# Patient Record
Sex: FEMALE | Race: WHITE | NOT HISPANIC OR LATINO | Employment: PART TIME | URBAN - METROPOLITAN AREA
[De-identification: names, ages, dates, MRNs, and addresses within clinical notes are randomized per-mention and may not be internally consistent; named-entity substitution may affect disease eponyms.]

---

## 2017-07-05 ENCOUNTER — TRANSCRIBE ORDERS (OUTPATIENT)
Dept: ADMINISTRATIVE | Facility: HOSPITAL | Age: 47
End: 2017-07-05

## 2017-07-05 DIAGNOSIS — Z12.31 VISIT FOR SCREENING MAMMOGRAM: Primary | ICD-10-CM

## 2017-07-25 ENCOUNTER — ALLSCRIPTS OFFICE VISIT (OUTPATIENT)
Dept: OTHER | Facility: OTHER | Age: 47
End: 2017-07-25

## 2017-07-25 DIAGNOSIS — M54.2 CERVICALGIA: ICD-10-CM

## 2017-07-25 DIAGNOSIS — E03.9 HYPOTHYROIDISM: ICD-10-CM

## 2017-07-27 ENCOUNTER — HOSPITAL ENCOUNTER (OUTPATIENT)
Dept: RADIOLOGY | Facility: MEDICAL CENTER | Age: 47
Discharge: HOME | End: 2017-07-27
Payer: COMMERCIAL

## 2017-07-27 DIAGNOSIS — M54.2 CERVICALGIA: ICD-10-CM

## 2017-07-27 PROCEDURE — 76536 US EXAM OF HEAD AND NECK: CPT

## 2017-08-01 ENCOUNTER — LAB CONVERSION - ENCOUNTER (OUTPATIENT)
Dept: OTHER | Facility: OTHER | Age: 47
End: 2017-08-01

## 2017-08-01 ENCOUNTER — GENERIC CONVERSION - ENCOUNTER (OUTPATIENT)
Dept: OTHER | Facility: OTHER | Age: 47
End: 2017-08-01

## 2017-08-01 LAB
T4 FREE SERPL-MCNC: 1.1 NG/DL (ref 0.8–1.8)
TSH SERPL DL<=0.05 MIU/L-ACNC: 2.38 MIU/L

## 2017-08-16 ENCOUNTER — HOSPITAL ENCOUNTER (OUTPATIENT)
Dept: RADIOLOGY | Age: 47
Discharge: HOME | End: 2017-08-16
Payer: COMMERCIAL

## 2017-08-16 DIAGNOSIS — Z12.31 VISIT FOR SCREENING MAMMOGRAM: ICD-10-CM

## 2017-08-16 PROCEDURE — G0202 SCR MAMMO BI INCL CAD: HCPCS

## 2017-08-16 PROCEDURE — 77063 BREAST TOMOSYNTHESIS BI: CPT

## 2018-01-13 VITALS
DIASTOLIC BLOOD PRESSURE: 86 MMHG | WEIGHT: 131.31 LBS | HEART RATE: 58 BPM | SYSTOLIC BLOOD PRESSURE: 130 MMHG | HEIGHT: 64 IN | BODY MASS INDEX: 22.42 KG/M2

## 2018-01-17 NOTE — RESULT NOTES
Message   thyroid labs are normal continue same dose of medication     Verified Results  (1) T4, FREE [id number] 08:55AM Grayson Hardin     Test Name Result Flag Reference   T4, FREE 1 2 ng/dL  0 8-1 8     (Q) TSH, 3RD GENERATION [id number] 08:55AM Grayson Lorenz   REPORT COMMENT:  FASTING:UNKNOWN     Test Name Result Flag Reference   TSH 1 95 mIU/L     Reference Range                         > or = 20 Years  0 40-4 50                              Pregnancy Ranges            First trimester    0 26-2 66            Second trimester   0 55-2 73            Third trimester    0 43-2 91

## 2018-01-18 NOTE — RESULT NOTES
Verified Results  (1) T4, FREE [id number] 08:28AM Caridad Mendoza     Test Name Result Flag Reference   T4, FREE 1 1 ng/dL  0 8-1 8     (Q) TSH, 3RD GENERATION [id number] 08:28AM Jane Orozco     Test Name Result Flag Reference   TSH 2 38 mIU/L     Reference Range                         > or = 20 Years  0 40-4 50                              Pregnancy Ranges            First trimester    0 26-2 66            Second trimester   0 55-2 73            Third trimester    0 43-2 91     US THYROID [id number] 01:51PM Erick Nicole Order Number: [id number]    - Patient Instructions: To schedule this appointment, please contact Central Scheduling at [phone]  Test Name Result Flag Reference   US THYROID (Report)     THYROID ULTRASOUND     INDICATION: Follow-up following right hemithyroidectomy     COMPARISON: 6/12/2013     TECHNIQUE:  Ultrasound of the thyroid was performed with a high frequency linear transducer in transverse and sagittal planes including volumetric imaging sweeps as well as traditional still imaging technique  FINDINGS:   Normal homogeneous smooth echotexture  The patient is status post partial right hemithyroidectomy  There is a small amount of residual thyroid tissue measuring 0 7 x 0 8 x 1 6 cm  The large nodule on the previous study has been resected  There is a 0 3 x 0 7 x 1 1 cm normal-appearing lymph node in the right anterior mid jugular chain  Left gland: 0 8 x 1 0 x 3 3 cm  No dominant nodules  Isthmus: The isthmus is 0 3 cm in AP dimension  IMPRESSION:      Status post partial right hemithyroidectomy with small residual normal right-sided thyroid tissue remaining  Unremarkable left lobe               Workstation performed: [id number]     Signed by:   French Alberto MD   7/31/17

## 2018-06-01 DIAGNOSIS — E03.9 HYPOTHYROIDISM, UNSPECIFIED TYPE: Primary | ICD-10-CM

## 2018-06-01 RX ORDER — LEVOTHYROXINE SODIUM 0.05 MG/1
TABLET ORAL
Qty: 108 TABLET | Refills: 1 | Status: SHIPPED | OUTPATIENT
Start: 2018-06-01 | End: 2018-07-30 | Stop reason: SDUPTHER

## 2018-07-26 ENCOUNTER — TRANSCRIBE ORDERS (OUTPATIENT)
Dept: ADMINISTRATIVE | Facility: HOSPITAL | Age: 48
End: 2018-07-26

## 2018-07-26 DIAGNOSIS — Z12.39 SCREENING BREAST EXAMINATION: Primary | ICD-10-CM

## 2018-07-30 ENCOUNTER — OFFICE VISIT (OUTPATIENT)
Dept: ENDOCRINOLOGY | Facility: CLINIC | Age: 48
End: 2018-07-30
Payer: COMMERCIAL

## 2018-07-30 VITALS
HEART RATE: 51 BPM | BODY MASS INDEX: 21.86 KG/M2 | DIASTOLIC BLOOD PRESSURE: 80 MMHG | WEIGHT: 131.2 LBS | HEIGHT: 65 IN | SYSTOLIC BLOOD PRESSURE: 110 MMHG

## 2018-07-30 DIAGNOSIS — E03.9 HYPOTHYROIDISM, UNSPECIFIED TYPE: Primary | ICD-10-CM

## 2018-07-30 LAB
T4 FREE SERPL-MCNC: 1.12 NG/DL (ref 0.76–1.46)
TSH SERPL DL<=0.05 MIU/L-ACNC: 1.9 UIU/ML (ref 0.36–3.74)

## 2018-07-30 PROCEDURE — 36415 COLL VENOUS BLD VENIPUNCTURE: CPT | Performed by: INTERNAL MEDICINE

## 2018-07-30 PROCEDURE — 84439 ASSAY OF FREE THYROXINE: CPT | Performed by: INTERNAL MEDICINE

## 2018-07-30 PROCEDURE — 99213 OFFICE O/P EST LOW 20 MIN: CPT | Performed by: INTERNAL MEDICINE

## 2018-07-30 PROCEDURE — 84443 ASSAY THYROID STIM HORMONE: CPT | Performed by: INTERNAL MEDICINE

## 2018-07-30 RX ORDER — LEVOTHYROXINE SODIUM 0.05 MG/1
TABLET ORAL
Qty: 110 TABLET | Refills: 3 | Status: SHIPPED | OUTPATIENT
Start: 2018-07-30 | End: 2019-04-15 | Stop reason: SDUPTHER

## 2018-07-30 NOTE — ASSESSMENT & PLAN NOTE
Continue levothyroxine at current dose  Will check thyroid function test today  If TSH normal she can follow up with her primary care from here onwards    Thyroid function test should be checked every 6 months to a year

## 2018-07-30 NOTE — LETTER
July 30, 2018     Bree Nair MD  [address]    Patient: Rajwinder Her   YOB: 1970   Date of Visit: 7/30/2018       Dear Dr Mitchell Begin: Thank you for referring Sapna Olmstead to me for evaluation  Below are my notes for this consultation  If you have questions, please do not hesitate to call me  I look forward to following your patient along with you  Sincerely,        Heaven Singh MD        CC: No Recipients  Heaven Singh MD  7/30/2018 10:56 AM  Sign at close encounter   Rajwinder Her 57 y o  female MRN: [id number]    Encounter: [id number]      Assessment/Plan     Problem List Items Addressed This Visit     Hypothyroidism - Primary     Continue levothyroxine at current dose  Will check thyroid function test today  If TSH normal she can follow up with her primary care from here onwards  Thyroid function test should be checked every 6 months to a year         Relevant Medications    levothyroxine 50 mcg tablet    Other Relevant Orders    T4, free Clinic Collect    TSH, 3rd generation Clinic Collect        CC:   HYPOTHYROIDISM    History of Present Illness      HPI: 57year old female with History of Large Right Sided Thyroid Nodule, s/p hemithyroidectomy (benign) and hypothyroidism here for follow-up  She is currently on levothyroxine 50 mcg 1 tablet Monday to Friday and 2 on Saturday and Sunday  Has been taking it regularly and properly  Generally feels well, denies any fatigue, weight changes, constipation or cold intolerance  Occasional sleep disturbances    Review of Systems   Constitutional: Negative for fatigue and unexpected weight change  Respiratory: Negative for cough and shortness of breath  Cardiovascular: Negative for chest pain, palpitations and leg swelling  Gastrointestinal: Negative for constipation, diarrhea, nausea and vomiting  Endocrine: Negative for polydipsia and polyuria     Skin: Negative for color change, pallor, rash and wound  Psychiatric/Behavioral: Positive for sleep disturbance  All other systems reviewed and are negative  Historical Information   History reviewed  No pertinent past medical history  Past Surgical History:   Procedure Laterality Date    THYROID LOBECTOMY       Social History   History   Alcohol Use No     History   Drug Use No     History   Smoking Status    Never Smoker   Smokeless Tobacco    Never Used     Family History: History reviewed  No pertinent family history  Meds/Allergies   Current Outpatient Prescriptions   Medication Sig Dispense Refill    levothyroxine 50 mcg tablet 1 tab mon-Friday and 2 on sat and Sunday 110 tablet 3     No current facility-administered medications for this visit  Allergies   Allergen Reactions    Bee Venom     Penicillins        Objective   Vitals: Blood pressure 110/80, pulse (!) 51, height 5' 5" (1 651 m), weight 59 5 kg (131 lb 3 2 oz)  Physical Exam   Constitutional: She is oriented to person, place, and time  She appears well-developed and well-nourished  No distress  HENT:   Head: Normocephalic and atraumatic  Mouth/Throat: No oropharyngeal exudate  Eyes: Conjunctivae and EOM are normal  No scleral icterus  Neck: Normal range of motion  Neck supple  Anterior neck surgical scar-no masses   Cardiovascular: Normal rate, regular rhythm and normal heart sounds  No murmur heard  Pulmonary/Chest: Effort normal and breath sounds normal    Abdominal: Soft  Bowel sounds are normal  She exhibits no distension  There is no tenderness  There is no rebound  Musculoskeletal: Normal range of motion  She exhibits no deformity  Neurological: She is alert and oriented to person, place, and time  Skin: Skin is warm  No rash noted  No erythema  No pallor  Psychiatric: She has a normal mood and affect   Her behavior is normal  Judgment and thought content normal        The history was obtained from the review of the chart, patient  Lab Results:   Lab Results   Component Value Date/Time    TSH 3RD GENERATON 2 38 07/31/2017 08:28 AM    Free T4 1 1 07/31/2017 08:28 AM     Imaging Studies:     Study Result     THYROID ULTRASOUND     INDICATION: Follow-up following right hemithyroidectomy     COMPARISON: 6/12/2013     TECHNIQUE:   Ultrasound of the thyroid was performed with a high frequency linear transducer in transverse and sagittal planes including volumetric imaging sweeps as well as traditional still imaging technique      FINDINGS:  Normal homogeneous smooth echotexture      The patient is status post partial right hemithyroidectomy  There is a small amount of residual thyroid tissue measuring 0 7 x 0 8 x 1 6 cm  The large nodule on the previous study has been resected       There is a 0 3 x 0 7 x 1 1 cm normal-appearing lymph node in the right anterior mid jugular chain      Left gland:  0 8 x 1 0 x 3 3 cm  No dominant nodules           Isthmus: The isthmus is 0 3 cm in AP dimension           IMPRESSION:     Status post partial right hemithyroidectomy with small residual normal right-sided thyroid tissue remaining  Unremarkable left lobe  I have personally reviewed pertinent reports  Portions of the record may have been created with voice recognition software  Occasional wrong word or "sound a like" substitutions may have occurred due to the inherent limitations of voice recognition software  Read the chart carefully and recognize, using context, where substitutions have occurred

## 2018-07-30 NOTE — LETTER
July 30, 2018     Richard Underwood MD  [address]  [address]    Patient: Yousif Hagen   YOB: 1970   Date of Visit: 7/30/2018       Dear Dr Catherine Byrnes: Thank you for referring Amber Soto to me for evaluation  Below are my notes for this consultation  If you have questions, please do not hesitate to call me  I look forward to following your patient along with you  Sincerely,        Azeem Gallegos MD        CC: No Recipients  Azeem Gallegos MD  7/30/2018 10:49 AM  Sign at close encounter   Yousif Hagen 57 y o  female MRN: [id number]    Encounter: [id number]      Assessment/Plan     Problem List Items Addressed This Visit     None      Visit Diagnoses     Hypothyroidism, unspecified type    -  Primary    Relevant Medications    levothyroxine 50 mcg tablet    Other Relevant Orders    T4, free Clinic Collect    TSH, 3rd generation Clinic Collect        CC:   ***    History of Present Illness      HPI: 57year old female with History of Large Right Sided Thyroid Nodule, s/p hemithyroidectomy (benign    Review of Systems    Historical Information   History reviewed  No pertinent past medical history  Past Surgical History:   Procedure Laterality Date    THYROID LOBECTOMY       Social History   History   Alcohol Use No     History   Drug Use No     History   Smoking Status    Never Smoker   Smokeless Tobacco    Never Used     Family History: History reviewed  No pertinent family history  Meds/Allergies   Current Outpatient Prescriptions   Medication Sig Dispense Refill    levothyroxine 50 mcg tablet 1 tab mon-Friday and 2 on sat and Sunday 110 tablet 3     No current facility-administered medications for this visit  Allergies   Allergen Reactions    Bee Venom     Penicillins        Objective   Vitals: Blood pressure 110/80, pulse (!) 51, height 5' 5" (1 651 m), weight 59 5 kg (131 lb 3 2 oz)      Physical Exam    The history was obtained from the review of the chart, {HISTORY OBTAINED FROM:[id number]}  Lab Results:   Lab Results   Component Value Date/Time    TSH 3RD GENERATON 2 38 07/31/2017 08:28 AM    Free T4 1 1 07/31/2017 08:28 AM     Imaging Studies:     Study Result     THYROID ULTRASOUND     INDICATION: Follow-up following right hemithyroidectomy     COMPARISON: 6/12/2013     TECHNIQUE:   Ultrasound of the thyroid was performed with a high frequency linear transducer in transverse and sagittal planes including volumetric imaging sweeps as well as traditional still imaging technique      FINDINGS:  Normal homogeneous smooth echotexture      The patient is status post partial right hemithyroidectomy  There is a small amount of residual thyroid tissue measuring 0 7 x 0 8 x 1 6 cm  The large nodule on the previous study has been resected       There is a 0 3 x 0 7 x 1 1 cm normal-appearing lymph node in the right anterior mid jugular chain      Left gland:  0 8 x 1 0 x 3 3 cm  No dominant nodules           Isthmus: The isthmus is 0 3 cm in AP dimension           IMPRESSION:     Status post partial right hemithyroidectomy with small residual normal right-sided thyroid tissue remaining  Unremarkable left lobe  I have personally reviewed pertinent reports  Portions of the record may have been created with voice recognition software  Occasional wrong word or "sound a like" substitutions may have occurred due to the inherent limitations of voice recognition software  Read the chart carefully and recognize, using context, where substitutions have occurred

## 2018-07-30 NOTE — PROGRESS NOTES
Rajwinder Her 57 y o  female MRN: [id number]    Encounter: [id number]      Assessment/Plan     Problem List Items Addressed This Visit     Hypothyroidism - Primary     Continue levothyroxine at current dose  Will check thyroid function test today  If TSH normal she can follow up with her primary care from here onwards  Thyroid function test should be checked every 6 months to a year         Relevant Medications    levothyroxine 50 mcg tablet    Other Relevant Orders    T4, free Clinic Collect    TSH, 3rd generation Clinic Collect        CC:   HYPOTHYROIDISM    History of Present Illness      HPI: 57year old female with History of Large Right Sided Thyroid Nodule, s/p hemithyroidectomy (benign) and hypothyroidism here for follow-up  She is currently on levothyroxine 50 mcg 1 tablet Monday to Friday and 2 on Saturday and Sunday  Has been taking it regularly and properly  Generally feels well, denies any fatigue, weight changes, constipation or cold intolerance  Occasional sleep disturbances    Review of Systems   Constitutional: Negative for fatigue and unexpected weight change  Respiratory: Negative for cough and shortness of breath  Cardiovascular: Negative for chest pain, palpitations and leg swelling  Gastrointestinal: Negative for constipation, diarrhea, nausea and vomiting  Endocrine: Negative for polydipsia and polyuria  Skin: Negative for color change, pallor, rash and wound  Psychiatric/Behavioral: Positive for sleep disturbance  All other systems reviewed and are negative  Historical Information   History reviewed  No pertinent past medical history  Past Surgical History:   Procedure Laterality Date    THYROID LOBECTOMY       Social History   History   Alcohol Use No     History   Drug Use No     History   Smoking Status    Never Smoker   Smokeless Tobacco    Never Used     Family History: History reviewed  No pertinent family history      Meds/Allergies   Current Outpatient Prescriptions   Medication Sig Dispense Refill    levothyroxine 50 mcg tablet 1 tab mon-Friday and 2 on sat and Sunday 110 tablet 3     No current facility-administered medications for this visit  Allergies   Allergen Reactions    Bee Venom     Penicillins        Objective   Vitals: Blood pressure 110/80, pulse (!) 51, height 5' 5" (1 651 m), weight 59 5 kg (131 lb 3 2 oz)  Physical Exam   Constitutional: She is oriented to person, place, and time  She appears well-developed and well-nourished  No distress  HENT:   Head: Normocephalic and atraumatic  Mouth/Throat: No oropharyngeal exudate  Eyes: Conjunctivae and EOM are normal  No scleral icterus  Neck: Normal range of motion  Neck supple  Anterior neck surgical scar-no masses   Cardiovascular: Normal rate, regular rhythm and normal heart sounds  No murmur heard  Pulmonary/Chest: Effort normal and breath sounds normal    Abdominal: Soft  Bowel sounds are normal  She exhibits no distension  There is no tenderness  There is no rebound  Musculoskeletal: Normal range of motion  She exhibits no deformity  Neurological: She is alert and oriented to person, place, and time  Skin: Skin is warm  No rash noted  No erythema  No pallor  Psychiatric: She has a normal mood and affect  Her behavior is normal  Judgment and thought content normal        The history was obtained from the review of the chart, patient      Lab Results:   Lab Results   Component Value Date/Time    TSH 3RD GENERATON 2 38 07/31/2017 08:28 AM    Free T4 1 1 07/31/2017 08:28 AM     Imaging Studies:     Study Result     THYROID ULTRASOUND     INDICATION: Follow-up following right hemithyroidectomy     COMPARISON: 6/12/2013     TECHNIQUE:   Ultrasound of the thyroid was performed with a high frequency linear transducer in transverse and sagittal planes including volumetric imaging sweeps as well as traditional still imaging technique      FINDINGS:  Normal homogeneous smooth echotexture      The patient is status post partial right hemithyroidectomy  There is a small amount of residual thyroid tissue measuring 0 7 x 0 8 x 1 6 cm  The large nodule on the previous study has been resected       There is a 0 3 x 0 7 x 1 1 cm normal-appearing lymph node in the right anterior mid jugular chain      Left gland:  0 8 x 1 0 x 3 3 cm  No dominant nodules           Isthmus: The isthmus is 0 3 cm in AP dimension           IMPRESSION:     Status post partial right hemithyroidectomy with small residual normal right-sided thyroid tissue remaining  Unremarkable left lobe  I have personally reviewed pertinent reports  Portions of the record may have been created with voice recognition software  Occasional wrong word or "sound a like" substitutions may have occurred due to the inherent limitations of voice recognition software  Read the chart carefully and recognize, using context, where substitutions have occurred

## 2018-08-01 ENCOUNTER — TELEPHONE (OUTPATIENT)
Dept: ENDOCRINOLOGY | Facility: CLINIC | Age: 48
End: 2018-08-01

## 2018-08-01 NOTE — PROGRESS NOTES
Please call the patient regarding her abnormal result   TSH OK- Continue levothyroxine at current dose and f/u with PCP

## 2018-08-20 ENCOUNTER — TRANSCRIBE ORDERS (OUTPATIENT)
Dept: ADMINISTRATIVE | Facility: HOSPITAL | Age: 48
End: 2018-08-20

## 2018-08-20 ENCOUNTER — HOSPITAL ENCOUNTER (OUTPATIENT)
Dept: RADIOLOGY | Age: 48
Discharge: HOME | End: 2018-08-20
Payer: COMMERCIAL

## 2018-08-20 DIAGNOSIS — Z12.39 SCREENING BREAST EXAMINATION: ICD-10-CM

## 2018-08-20 DIAGNOSIS — R92.2 BREAST DENSITY: Primary | ICD-10-CM

## 2018-08-20 DIAGNOSIS — R92.2 DENSE BREAST TISSUE: ICD-10-CM

## 2018-08-20 PROCEDURE — 77063 BREAST TOMOSYNTHESIS BI: CPT

## 2018-08-20 PROCEDURE — 77067 SCR MAMMO BI INCL CAD: CPT

## 2018-08-23 ENCOUNTER — HOSPITAL ENCOUNTER (OUTPATIENT)
Dept: ULTRASOUND IMAGING | Facility: CLINIC | Age: 48
Discharge: HOME | End: 2018-08-23
Payer: COMMERCIAL

## 2018-08-23 DIAGNOSIS — R92.2 BREAST DENSITY: ICD-10-CM

## 2018-08-23 PROCEDURE — 76377 3D RENDER W/INTRP POSTPROCES: CPT

## 2018-08-23 PROCEDURE — 76641 ULTRASOUND BREAST COMPLETE: CPT

## 2019-01-16 ENCOUNTER — APPOINTMENT (EMERGENCY)
Dept: ULTRASOUND IMAGING | Facility: HOSPITAL | Age: 49
End: 2019-01-16
Payer: COMMERCIAL

## 2019-01-16 ENCOUNTER — HOSPITAL ENCOUNTER (EMERGENCY)
Facility: HOSPITAL | Age: 49
Discharge: HOME | End: 2019-01-16
Attending: EMERGENCY MEDICINE | Admitting: EMERGENCY MEDICINE
Payer: COMMERCIAL

## 2019-01-16 ENCOUNTER — APPOINTMENT (EMERGENCY)
Dept: RADIOLOGY | Facility: HOSPITAL | Age: 49
End: 2019-01-16
Payer: COMMERCIAL

## 2019-01-16 VITALS
WEIGHT: 127 LBS | BODY MASS INDEX: 21.16 KG/M2 | SYSTOLIC BLOOD PRESSURE: 125 MMHG | DIASTOLIC BLOOD PRESSURE: 59 MMHG | OXYGEN SATURATION: 100 % | HEIGHT: 65 IN | TEMPERATURE: 98.6 F | RESPIRATION RATE: 14 BRPM | HEART RATE: 65 BPM

## 2019-01-16 DIAGNOSIS — R60.0 PEDAL EDEMA: Primary | ICD-10-CM

## 2019-01-16 LAB
ALBUMIN SERPL BCP-MCNC: 3.7 G/DL (ref 3.5–5)
ALP SERPL-CCNC: 54 U/L (ref 46–116)
ALT SERPL W P-5'-P-CCNC: 15 U/L (ref 12–78)
ANION GAP SERPL CALCULATED.3IONS-SCNC: 7 MMOL/L (ref 4–13)
AST SERPL W P-5'-P-CCNC: 12 U/L (ref 5–45)
ATRIAL RATE: 56 BPM
BASOPHILS # BLD AUTO: 0.02 THOUSANDS/ΜL (ref 0–0.1)
BASOPHILS NFR BLD AUTO: 0 % (ref 0–1)
BILIRUB SERPL-MCNC: 0.7 MG/DL (ref 0.2–1)
BUN SERPL-MCNC: 12 MG/DL (ref 5–25)
CALCIUM SERPL-MCNC: 8.9 MG/DL (ref 8.3–10.1)
CHLORIDE SERPL-SCNC: 104 MMOL/L (ref 100–108)
CO2 SERPL-SCNC: 30 MMOL/L (ref 21–32)
CREAT SERPL-MCNC: 0.85 MG/DL (ref 0.6–1.3)
EOSINOPHIL # BLD AUTO: 0.1 THOUSAND/ΜL (ref 0–0.61)
EOSINOPHIL NFR BLD AUTO: 2 % (ref 0–6)
ERYTHROCYTE [DISTWIDTH] IN BLOOD BY AUTOMATED COUNT: 12.3 % (ref 11.6–15.1)
GFR SERPL CREATININE-BSD FRML MDRD: 82 ML/MIN/1.73SQ M
GLUCOSE SERPL-MCNC: 87 MG/DL (ref 65–140)
HCT VFR BLD AUTO: 35.4 % (ref 34.8–46.1)
HGB BLD-MCNC: 11.4 G/DL (ref 11.5–15.4)
IMM GRANULOCYTES # BLD AUTO: 0.04 THOUSAND/UL (ref 0–0.2)
IMM GRANULOCYTES NFR BLD AUTO: 1 % (ref 0–2)
LYMPHOCYTES # BLD AUTO: 1.31 THOUSANDS/ΜL (ref 0.6–4.47)
LYMPHOCYTES NFR BLD AUTO: 24 % (ref 14–44)
MAGNESIUM SERPL-MCNC: 2 MG/DL (ref 1.6–2.6)
MCH RBC QN AUTO: 27.2 PG (ref 26.8–34.3)
MCHC RBC AUTO-ENTMCNC: 32.2 G/DL (ref 31.4–37.4)
MCV RBC AUTO: 85 FL (ref 82–98)
MONOCYTES # BLD AUTO: 0.39 THOUSAND/ΜL (ref 0.17–1.22)
MONOCYTES NFR BLD AUTO: 7 % (ref 4–12)
NEUTROPHILS # BLD AUTO: 3.64 THOUSANDS/ΜL (ref 1.85–7.62)
NEUTS SEG NFR BLD AUTO: 66 % (ref 43–75)
NRBC BLD AUTO-RTO: 0 /100 WBCS
NT-PROBNP SERPL-MCNC: 107 PG/ML
P AXIS: 74 DEGREES
PLATELET # BLD AUTO: 278 THOUSANDS/UL (ref 149–390)
PMV BLD AUTO: 9.1 FL (ref 8.9–12.7)
POTASSIUM SERPL-SCNC: 3.8 MMOL/L (ref 3.5–5.3)
PR INTERVAL: 176 MS
PROT SERPL-MCNC: 6.7 G/DL (ref 6.4–8.2)
QRS AXIS: 84 DEGREES
QRSD INTERVAL: 94 MS
QT INTERVAL: 428 MS
QTC INTERVAL: 413 MS
RBC # BLD AUTO: 4.19 MILLION/UL (ref 3.81–5.12)
SODIUM SERPL-SCNC: 141 MMOL/L (ref 136–145)
T WAVE AXIS: 74 DEGREES
TSH SERPL DL<=0.05 MIU/L-ACNC: 2.34 UIU/ML (ref 0.36–3.74)
VENTRICULAR RATE: 56 BPM
WBC # BLD AUTO: 5.5 THOUSAND/UL (ref 4.31–10.16)

## 2019-01-16 PROCEDURE — 93971 EXTREMITY STUDY: CPT

## 2019-01-16 PROCEDURE — 84443 ASSAY THYROID STIM HORMONE: CPT | Performed by: EMERGENCY MEDICINE

## 2019-01-16 PROCEDURE — 83880 ASSAY OF NATRIURETIC PEPTIDE: CPT | Performed by: EMERGENCY MEDICINE

## 2019-01-16 PROCEDURE — 80053 COMPREHEN METABOLIC PANEL: CPT | Performed by: EMERGENCY MEDICINE

## 2019-01-16 PROCEDURE — 99284 EMERGENCY DEPT VISIT MOD MDM: CPT

## 2019-01-16 PROCEDURE — 71046 X-RAY EXAM CHEST 2 VIEWS: CPT

## 2019-01-16 PROCEDURE — 36415 COLL VENOUS BLD VENIPUNCTURE: CPT | Performed by: EMERGENCY MEDICINE

## 2019-01-16 PROCEDURE — 83735 ASSAY OF MAGNESIUM: CPT | Performed by: EMERGENCY MEDICINE

## 2019-01-16 PROCEDURE — 93971 EXTREMITY STUDY: CPT | Performed by: SURGERY

## 2019-01-16 PROCEDURE — 93005 ELECTROCARDIOGRAM TRACING: CPT

## 2019-01-16 PROCEDURE — 85025 COMPLETE CBC W/AUTO DIFF WBC: CPT | Performed by: EMERGENCY MEDICINE

## 2019-01-16 PROCEDURE — 93010 ELECTROCARDIOGRAM REPORT: CPT | Performed by: INTERNAL MEDICINE

## 2019-01-16 NOTE — DISCHARGE INSTRUCTIONS
Leg Edema   WHAT YOU NEED TO KNOW:   Leg edema is swelling caused by fluid buildup  Your legs may swell if you sit or stand for long periods of time, are pregnant, or are injured  Swelling may also occur if you have heart failure or circulation problems  This means that your heart does not pump blood through your body as it should  DISCHARGE INSTRUCTIONS:   Self-care:   · Elevate your legs:  Raise your legs above the level of your heart as often as you can  This will help decrease swelling and pain  Prop your legs on pillows or blankets to keep them elevated comfortably  · Wear pressure stockings: These tight stockings put pressure on your legs to promote blood flow and prevent blood clots  Wear the stockings during the day  Do not wear them while you sleep  · Apply heat:  Heat helps decrease pain and swelling  Apply heat on the area for 20 to 30 minutes every 2 hours for as many days as directed  · Stay active:  Do not stand or sit for long periods of time  Ask your healthcare provider about the best exercise plan for you  · Eat healthy foods:  Healthy foods include fruits, vegetables, whole-grain breads, low-fat dairy products, beans, lean meats, and fish  Ask if you need to be on a special diet  Limit salt  Salt will make your body hold even more fluid  Follow up with your healthcare provider as directed:  Write down your questions so you remember to ask them during your visits  Contact your healthcare provider if:   · You have a fever or feel more tired than usual     · The veins in your legs look larger than usual  They may look full or bulging  · Your legs itch or feel heavy  · You have red or white areas or sores on your legs  The skin may also appear dimpled or have indentations  · You are gaining weight  · You have trouble moving your ankles  · The swelling does not go away, or other parts of your body swell      · You have questions or concerns about your condition or care   Return to the emergency department if:   · You cannot walk  · You feel faint or confused  · Your skin turns blue or gray  · Your leg feels warm, tender, and painful  It may be swollen and red  · You have chest pain or trouble breathing that is worse when you lie down  · You suddenly feel lightheaded and have trouble breathing  · You have new and sudden chest pain  You may have more pain when you take deep breaths or cough  You may also cough up blood  © 2017 [employer]  Information is for End User's use only and may not be sold, redistributed or otherwise used for commercial purposes  All illustrations and images included in CareNotes® are the copyrighted property of [employer]  or [employer]  The above information is an  only  It is not intended as medical advice for individual conditions or treatments  Talk to your doctor, nurse or pharmacist before following any medical regimen to see if it is safe and effective for you

## 2019-01-16 NOTE — ED PROVIDER NOTES
History  Chief Complaint   Patient presents with    Leg Swelling     woke up two days ago, left knee and leg swelling  woke up to right leg swelling this morning and swelling and pain in hands  no known injuries  pt had ultrasound of R leg yesterday  61-year-old female patient presents emergency department for evaluation of pedal edema  The patient is unsure as to what causes she has never had this before  Patient does have a saphenous vein on the right side which has been treated in the past and she is in follow-up for this recently  Patient has no shortness of breath, chest pains, no nausea, no vomiting, diarrhea, fevers, chills  The patient's lungs are clear equal bilaterally and she is not working to breathe  There is no adventitious breath sounds there is no accessory muscle use  The patient will be evaluated with a differential diagnosis to include but not be limited to benign pedal edema, congestive heart failure, DVT  History provided by:  Patient   used: No    Medical Problem   Severity:  Mild  Onset quality:  Gradual  Timing:  Constant  Progression:  Waxing and waning  Chronicity:  New  Associated symptoms: no cough, no fever, no myalgias, no rhinorrhea and no vomiting        Prior to Admission Medications   Prescriptions Last Dose Informant Patient Reported? Taking?   levothyroxine 50 mcg tablet   No No   Si tab mon-Friday and 2 on sat and       Facility-Administered Medications: None       Past Medical History:   Diagnosis Date    Mitral valve disorder        Past Surgical History:   Procedure Laterality Date    SAPHENOUS VEIN GRAFT RESECTION      THYROID LOBECTOMY      VENTRICULAR ABLATION SURGERY         History reviewed  No pertinent family history  I have reviewed and agree with the history as documented      Social History   Substance Use Topics    Smoking status: Never Smoker    Smokeless tobacco: Never Used    Alcohol use No        Review of Systems   Constitutional: Negative for fever  HENT: Negative for rhinorrhea  Respiratory: Negative for cough  Gastrointestinal: Negative for vomiting  Musculoskeletal: Negative for myalgias  All other systems reviewed and are negative  Physical Exam  Physical Exam   Constitutional: She is oriented to person, place, and time  She appears well-developed and well-nourished  HENT:   Head: Normocephalic and atraumatic  Right Ear: External ear normal    Left Ear: External ear normal    Eyes: Conjunctivae and EOM are normal    Neck: No JVD present  No tracheal deviation present  No thyromegaly present  Cardiovascular: Normal rate  Pulmonary/Chest: Effort normal and breath sounds normal  No stridor  Abdominal: Soft  She exhibits no distension and no mass  There is no tenderness  There is no guarding  No hernia  Musculoskeletal: Normal range of motion  She exhibits no edema, tenderness or deformity  Lymphadenopathy:     She has no cervical adenopathy  Neurological: She is alert and oriented to person, place, and time  Skin: Skin is warm  No rash noted  No erythema  No pallor  Psychiatric: She has a normal mood and affect  Her behavior is normal    Nursing note and vitals reviewed        Vital Signs  ED Triage Vitals   Temperature Pulse Respirations Blood Pressure SpO2   01/16/19 1646 01/16/19 1646 01/16/19 1646 01/16/19 1646 01/16/19 1646   98 6 °F (37 °C) 77 14 130/51 98 %      Temp src Heart Rate Source Patient Position - Orthostatic VS BP Location FiO2 (%)   -- 01/16/19 1840 01/16/19 1840 01/16/19 1840 --    Monitor Lying Left arm       Pain Score       01/16/19 1646       4           Vitals:    01/16/19 1646 01/16/19 1840   BP: 130/51 125/59   Pulse: 77 65   Patient Position - Orthostatic VS:  Lying       Visual Acuity      ED Medications  Medications - No data to display    Diagnostic Studies  Results Reviewed     Procedure Component Value Units Date/Time    BNP [[id number]]  (Normal) Collected:  01/16/19 1728    Lab Status:  Final result Specimen:  Blood from Arm, Right Updated:  01/16/19 1803     NT-proBNP 107 pg/mL     TSH, 3rd generation with Free T4 reflex [[id number]]  (Normal) Collected:  01/16/19 1728    Lab Status:  Final result Specimen:  Blood from Arm, Right Updated:  01/16/19 1803     TSH 3RD GENERATON 2 339 uIU/mL     Narrative:         Patients undergoing fluorescein dye angiography may retain small amounts of fluorescein in the body for 48-72 hours post procedure  Samples containing fluorescein can produce falsely depressed TSH values  If the patient had this procedure,a specimen should be resubmitted post fluorescein clearance  The recommended reference ranges for TSH during pregnancy are as follows:  First trimester 0 1 to 2 5 uIU/mL  Second trimester  0 2 to 3 0 uIU/mL  Third trimester 0 3 to 3 0 uIU/m      Magnesium [[id number]]  (Normal) Collected:  01/16/19 1728    Lab Status:  Final result Specimen:  Blood from Arm, Right Updated:  01/16/19 1803     Magnesium 2 0 mg/dL     Comprehensive metabolic panel [[id number]] Collected:  01/16/19 1728    Lab Status:  Final result Specimen:  Blood from Arm, Right Updated:  01/16/19 1755     Sodium 141 mmol/L      Potassium 3 8 mmol/L      Chloride 104 mmol/L      CO2 30 mmol/L      ANION GAP 7 mmol/L      BUN 12 mg/dL      Creatinine 0 85 mg/dL      Glucose 87 mg/dL      Calcium 8 9 mg/dL      AST 12 U/L      ALT 15 U/L      Alkaline Phosphatase 54 U/L      Total Protein 6 7 g/dL      Albumin 3 7 g/dL      Total Bilirubin 0 70 mg/dL      eGFR 82 ml/min/1 73sq m     Narrative:         National Kidney Disease Education Program recommendations are as follows:  GFR calculation is accurate only with a steady state creatinine  Chronic Kidney disease less than 60 ml/min/1 73 sq  meters  Kidney failure less than 15 ml/min/1 73 sq  meters      CBC and differential [[id number]]  (Abnormal) Collected:  01/16/19 1728    Lab Status:  Final result Specimen:  Blood from Arm, Right Updated:  01/16/19 1740     WBC 5 50 Thousand/uL      RBC 4 19 Million/uL      Hemoglobin 11 4 (L) g/dL      Hematocrit 35 4 %      MCV 85 fL      MCH 27 2 pg      MCHC 32 2 g/dL      RDW 12 3 %      MPV 9 1 fL      Platelets 907 Thousands/uL      nRBC 0 /100 WBCs      Neutrophils Relative 66 %      Immat GRANS % 1 %      Lymphocytes Relative 24 %      Monocytes Relative 7 %      Eosinophils Relative 2 %      Basophils Relative 0 %      Neutrophils Absolute 3 64 Thousands/µL      Immature Grans Absolute 0 04 Thousand/uL      Lymphocytes Absolute 1 31 Thousands/µL      Monocytes Absolute 0 39 Thousand/µL      Eosinophils Absolute 0 10 Thousand/µL      Basophils Absolute 0 02 Thousands/µL                  XR chest 2 views   ED Interpretation by Minal Streeter DO (01/16 1843)   Chest xray was evaluated and interpreted by me primarily  There was no obvious air trapping/emphysema, there was no pulmonary congestion to show CHF,  There was were no infiltrates, and there was no obvious pleural effusion:      Final Result by Anisa Olvera MD (01/16 2026)      No acute cardiopulmonary disease              Workstation performed: [id number]         VAS lower limb venous duplex study, unilateral/limited   Final Result by Del Harper MD (01/16 1840)                 Procedures  Procedures       Phone Contacts  ED Phone Contact    ED Course                               MDM  Number of Diagnoses or Management Options  Pedal edema: new and requires workup     Amount and/or Complexity of Data Reviewed  Clinical lab tests: ordered and reviewed  Tests in the radiology section of CPT®: ordered and reviewed  Decide to obtain previous medical records or to obtain history from someone other than the patient: yes  Review and summarize past medical records: yes    Patient Progress  Patient progress: stable    CritCare Time    Disposition  Final diagnoses:   Pedal edema     Time reflects when diagnosis was documented in both MDM as applicable and the Disposition within this note     Time User Action Codes Description Comment    1/16/2019  6:44 PM Rio Toney Add [R60 0] Pedal edema       ED Disposition     ED Disposition Condition Comment    Discharge  Pamela Canales discharge to home/self care  Condition at discharge: Good        Follow-up Information     Follow up With Specialties Details Why Contact Info    Trung Camara MD Family Medicine   [address] [address]  [phone]            Discharge Medication List as of 1/16/2019  6:46 PM      CONTINUE these medications which have NOT CHANGED    Details   levothyroxine 50 mcg tablet 1 tab mon-Friday and 2 on sat and Sunday, Normal           No discharge procedures on file      ED Provider  Electronically Signed by           Jordy Boyce DO  01/18/19 1069

## 2019-01-17 NOTE — ED NOTES
D/c reviewed with pt prior to discharge by provider  Ambulatory off unit with steady gait        Hampton Cooks, RN  63/95/22 1920

## 2019-04-15 ENCOUNTER — TELEPHONE (OUTPATIENT)
Dept: ENDOCRINOLOGY | Facility: CLINIC | Age: 49
End: 2019-04-15

## 2019-04-15 DIAGNOSIS — E03.9 HYPOTHYROIDISM, UNSPECIFIED TYPE: ICD-10-CM

## 2019-04-15 RX ORDER — LEVOTHYROXINE SODIUM 0.05 MG/1
TABLET ORAL
Qty: 110 TABLET | Refills: 1 | Status: SHIPPED | OUTPATIENT
Start: 2019-04-15 | End: 2019-08-16 | Stop reason: SDUPTHER

## 2019-07-30 ENCOUNTER — TRANSCRIBE ORDERS (OUTPATIENT)
Dept: ADMINISTRATIVE | Facility: HOSPITAL | Age: 49
End: 2019-07-30

## 2019-07-30 DIAGNOSIS — Z12.39 BREAST SCREENING, UNSPECIFIED: Primary | ICD-10-CM

## 2019-08-16 ENCOUNTER — OFFICE VISIT (OUTPATIENT)
Dept: ENDOCRINOLOGY | Facility: CLINIC | Age: 49
End: 2019-08-16
Payer: COMMERCIAL

## 2019-08-16 VITALS
HEART RATE: 64 BPM | DIASTOLIC BLOOD PRESSURE: 84 MMHG | SYSTOLIC BLOOD PRESSURE: 122 MMHG | WEIGHT: 126 LBS | BODY MASS INDEX: 20.99 KG/M2 | HEIGHT: 65 IN

## 2019-08-16 DIAGNOSIS — E03.9 HYPOTHYROIDISM, UNSPECIFIED TYPE: Primary | ICD-10-CM

## 2019-08-16 PROCEDURE — 99213 OFFICE O/P EST LOW 20 MIN: CPT | Performed by: INTERNAL MEDICINE

## 2019-08-16 RX ORDER — LEVOTHYROXINE SODIUM 0.05 MG/1
TABLET ORAL
Qty: 110 TABLET | Refills: 3 | Status: SHIPPED | OUTPATIENT
Start: 2019-08-16 | End: 2020-08-05 | Stop reason: SDUPTHER

## 2019-08-16 NOTE — ASSESSMENT & PLAN NOTE
Continue levothyroxine at current dose-will check thyroid function tests    If normal then she can follow up with her primary care from here onwards

## 2019-08-16 NOTE — PROGRESS NOTES
Beverly Freire 54 y o  female MRN: [id number]    Encounter: [id number]      Assessment/Plan     Problem List Items Addressed This Visit        Endocrine    Hypothyroidism - Primary     Continue levothyroxine at current dose-will check thyroid function tests  If normal then she can follow up with her primary care from here onwards         Relevant Medications    levothyroxine 50 mcg tablet    Other Relevant Orders    T4, free Lab Collect    TSH, 3rd generation Lab Collect        CC:   hypothyroidism     History of Present Illness     HPI:  55-year old female with history of hyper thyroidism status post hemithyroidectomy for large right-sided thyroid nodule  She is currently on levothyroxine 1 tab mon-Friday and 2 on sat and Sunday and has been taking it regularly and properly  Generally feels well-denies any fatigue, weight changes, constipation or cold intolerance      Review of Systems   Constitutional: Negative for fatigue and unexpected weight change  HENT: Negative for trouble swallowing  Eyes: Negative for visual disturbance  Respiratory: Negative for cough and shortness of breath  Cardiovascular: Negative for palpitations and leg swelling  Gastrointestinal: Negative for constipation, diarrhea, nausea and vomiting  Musculoskeletal: Negative for arthralgias, gait problem and myalgias  Skin: Negative for pallor, rash and wound  Psychiatric/Behavioral: Negative for sleep disturbance  All other systems reviewed and are negative        Historical Information   Past Medical History:   Diagnosis Date    Mitral valve disorder      Past Surgical History:   Procedure Laterality Date    SAPHENOUS VEIN GRAFT RESECTION      THYROID LOBECTOMY      VENTRICULAR ABLATION SURGERY       Social History   Social History     Substance and Sexual Activity   Alcohol Use No     Social History     Substance and Sexual Activity   Drug Use No     Social History     Tobacco Use   Smoking Status Never Smoker Smokeless Tobacco Never Used     Family History: No family history on file  Meds/Allergies   Current Outpatient Medications   Medication Sig Dispense Refill    levothyroxine 50 mcg tablet 1 tab mon-Friday and 2 on sat and Sunday 110 tablet 3     No current facility-administered medications for this visit  Allergies   Allergen Reactions    Bee Venom     Penicillins        Objective   Vitals: Blood pressure 122/84, pulse 64, height 5' 4 5" (1 638 m), weight 57 2 kg (126 lb)  Physical Exam   Constitutional: She is oriented to person, place, and time  She appears well-developed and well-nourished  No distress  HENT:   Head: Normocephalic and atraumatic  Eyes: EOM are normal  No scleral icterus  Neck: Normal range of motion  Neck supple  Anterior neck surgical scar-no masses   Cardiovascular: Normal rate and regular rhythm  Pulmonary/Chest: Effort normal and breath sounds normal  No respiratory distress  She has no wheezes  She has no rales  Abdominal: Soft  Bowel sounds are normal  She exhibits no distension  There is no tenderness  There is no guarding  Musculoskeletal: Normal range of motion  She exhibits no edema or deformity  Lymphadenopathy:     She has no cervical adenopathy  Neurological: She is alert and oriented to person, place, and time  Skin: Skin is warm and dry  Psychiatric: She has a normal mood and affect  Her behavior is normal  Judgment and thought content normal        The history was obtained from the review of the chart, patient  Lab Results:   Lab Results   Component Value Date/Time    TSH 3RD GENERATON 2 339 01/16/2019 05:28 PM       Imaging Studies:   Results for orders placed during the hospital encounter of 07/27/17   US thyroid    Impression    Status post partial right hemithyroidectomy with small residual normal right-sided thyroid tissue remaining  Unremarkable left lobe               Workstation performed: [id number]         I have personally reviewed pertinent reports  Portions of the record may have been created with voice recognition software  Occasional wrong word or "sound a like" substitutions may have occurred due to the inherent limitations of voice recognition software  Read the chart carefully and recognize, using context, where substitutions have occurred

## 2019-09-03 ENCOUNTER — HOSPITAL ENCOUNTER (OUTPATIENT)
Dept: MAMMOGRAPHY | Facility: CLINIC | Age: 49
Discharge: HOME | End: 2019-09-03
Payer: COMMERCIAL

## 2019-09-03 ENCOUNTER — TRANSCRIBE ORDERS (OUTPATIENT)
Dept: MAMMOGRAPHY | Facility: CLINIC | Age: 49
End: 2019-09-03

## 2019-09-03 VITALS — WEIGHT: 126 LBS | BODY MASS INDEX: 20.99 KG/M2 | HEIGHT: 65 IN

## 2019-09-03 DIAGNOSIS — Z12.39 BREAST SCREENING, UNSPECIFIED: Primary | ICD-10-CM

## 2019-09-03 DIAGNOSIS — Z12.39 BREAST SCREENING, UNSPECIFIED: ICD-10-CM

## 2019-09-03 PROCEDURE — 77067 SCR MAMMO BI INCL CAD: CPT

## 2019-09-03 PROCEDURE — 77063 BREAST TOMOSYNTHESIS BI: CPT

## 2019-09-10 LAB
T4 FREE SERPL-MCNC: 1.1 NG/DL (ref 0.8–1.8)
TSH SERPL-ACNC: 1.16 MIU/L

## 2019-09-11 ENCOUNTER — TELEPHONE (OUTPATIENT)
Dept: ENDOCRINOLOGY | Facility: CLINIC | Age: 49
End: 2019-09-11

## 2019-09-11 NOTE — TELEPHONE ENCOUNTER
----- Message from Karel Warren MD sent at 9/11/2019 10:51 AM EDT -----  Please call the patient regarding labs - thyroid function tests normal-continue levothyroxine at current dose

## 2019-09-11 NOTE — RESULT ENCOUNTER NOTE
Please call the patient regarding labs - thyroid function tests normal-continue levothyroxine at current dose

## 2019-10-01 ENCOUNTER — HOSPITAL ENCOUNTER (OUTPATIENT)
Dept: ULTRASOUND IMAGING | Facility: CLINIC | Age: 49
Discharge: HOME | End: 2019-10-01
Payer: COMMERCIAL

## 2019-10-01 DIAGNOSIS — Z12.39 BREAST SCREENING: ICD-10-CM

## 2019-10-01 PROCEDURE — 76377 3D RENDER W/INTRP POSTPROCES: CPT

## 2019-10-01 PROCEDURE — 76641 ULTRASOUND BREAST COMPLETE: CPT

## 2020-03-13 PROBLEM — M54.2 NECK DISCOMFORT: Status: ACTIVE | Noted: 2017-07-25

## 2020-08-05 ENCOUNTER — OFFICE VISIT (OUTPATIENT)
Dept: FAMILY MEDICINE CLINIC | Facility: CLINIC | Age: 50
End: 2020-08-05
Payer: COMMERCIAL

## 2020-08-05 VITALS
HEIGHT: 65 IN | OXYGEN SATURATION: 99 % | BODY MASS INDEX: 20.62 KG/M2 | HEART RATE: 74 BPM | TEMPERATURE: 97 F | DIASTOLIC BLOOD PRESSURE: 64 MMHG | WEIGHT: 123.8 LBS | SYSTOLIC BLOOD PRESSURE: 100 MMHG

## 2020-08-05 DIAGNOSIS — Z98.62: ICD-10-CM

## 2020-08-05 DIAGNOSIS — Z98.890 HISTORY OF CARDIAC RADIOFREQUENCY ABLATION: ICD-10-CM

## 2020-08-05 DIAGNOSIS — E03.9 HYPOTHYROIDISM, UNSPECIFIED TYPE: Primary | ICD-10-CM

## 2020-08-05 DIAGNOSIS — Z91.030 HISTORY OF SYSTEMIC REACTION TO BEE STING: ICD-10-CM

## 2020-08-05 DIAGNOSIS — Z00.00 ENCOUNTER FOR MEDICAL EXAMINATION TO ESTABLISH CARE: ICD-10-CM

## 2020-08-05 PROCEDURE — 99204 OFFICE O/P NEW MOD 45 MIN: CPT | Performed by: PHYSICIAN ASSISTANT

## 2020-08-05 PROCEDURE — 3725F SCREEN DEPRESSION PERFORMED: CPT | Performed by: PHYSICIAN ASSISTANT

## 2020-08-05 PROCEDURE — 1036F TOBACCO NON-USER: CPT | Performed by: PHYSICIAN ASSISTANT

## 2020-08-05 PROCEDURE — 3008F BODY MASS INDEX DOCD: CPT | Performed by: PHYSICIAN ASSISTANT

## 2020-08-05 RX ORDER — LEVOTHYROXINE SODIUM 0.05 MG/1
TABLET ORAL
Qty: 110 TABLET | Refills: 3 | Status: SHIPPED | OUTPATIENT
Start: 2020-08-05 | End: 2021-08-09

## 2020-08-05 RX ORDER — EPINEPHRINE 0.3 MG/.3ML
0.3 INJECTION SUBCUTANEOUS ONCE
Qty: 0.6 ML | Refills: 0 | Status: SHIPPED | OUTPATIENT
Start: 2020-08-05 | End: 2020-08-05

## 2020-08-05 NOTE — PROGRESS NOTES
Assessment/Plan:       Problem List Items Addressed This Visit        Endocrine    Hypothyroidism - Primary    Relevant Medications    levothyroxine 50 mcg tablet    Other Relevant Orders    TSH, 3rd generation with Free T4 reflex       Other    History of systemic reaction to bee sting    Relevant Medications    EPINEPHrine (EPIPEN) 0 3 mg/0 3 mL SOAJ    History of cardiac radiofrequency ablation    History of angioplasty of vein      Other Visit Diagnoses     Encounter for medical examination to establish care             Hx reviewed and updated, HM updated  Pt will follow up with cardiology given hx of ablation for tachyarrhythmia  Cardiac exam normal today RRR no MRG  Monitor skin lesion on face found on exam, if changes does not improve needs follow up possible biopsy  Refill epipen  Update TSH  Refill levothyroxine  Follow up in 1 year or earlier as needed    Subjective:      Patient ID: Sarah Mart is a 52 y o  female  Tubal ligation 2009    Pt presents today to establish care  She has no acute complaints and shares she received regular primacy care prior to establishing    PMH  - hypothyroidism, thyroidectomy in 2012, on levothyroxine 50mg, last TSH 1 year ago was normal  - hx of tubal ligation in 2009  - hx of tachycardic erythema needing ablation in 2000, was on beta blocker for sometime after this, but since stopped  She does follow with a cardiologist, last visit was > 1 year ago  She shares since the ablation she will have palpitations and presyncope at times, however has had multiple workups and holters and no significant results are found  No cardiac complaints today  - hx of vein removal of RLE due to swelling and irritation of the vein  She shares she did not have a DVT and was on nothing besides aspirin for 3 months after the surgery     - receives regular mammograms and PAP smears, never abnormal   - screening labs have been normal per pt    FH  - no significant FH    SH  - denies use of tobacco, illicit drugs  - very occasional social alcohol  - exercises regularly, is an avid runner  - eats healthy diet, not vegan or vegetarian    Meds  - levothyroxine 50mg    Allergies  - penicillin, bee stings (anaphylaxis, last sting was as a child)      The following portions of the patient's history were reviewed and updated as appropriate:   She  has a past medical history of Arthritis and Mitral valve disorder  She   Patient Active Problem List    Diagnosis Date Noted    History of systemic reaction to bee sting 08/05/2020    History of cardiac radiofrequency ablation 08/05/2020    History of angioplasty of vein 08/05/2020    Neck discomfort 07/25/2017    Fatigue 03/11/2014    Hypothyroidism 08/29/2013    Nontoxic single thyroid nodule 06/05/2013     She  has a past surgical history that includes Thyroid lobectomy (Right); Ventricular ablation surgery; Saphenous vein graft resection; Tubal ligation (2009); and Varicose vein surgery (Right, 2009)  Her family history includes Brain cancer (age of onset: 54) in her maternal grandmother; Breast cancer (age of onset: 39) in her maternal aunt; Breast cancer (age of onset: 54) in her maternal aunt; Breast cancer (age of onset: 72) in her paternal aunt; Cancer (age of onset: 58) in her maternal grandmother; Heart disease in her father; No Known Problems in her daughter, daughter, mother, paternal grandmother, sister, and sister  She  reports that she has never smoked  She has never used smokeless tobacco  She reports that she does not drink alcohol or use drugs  Current Outpatient Medications   Medication Sig Dispense Refill    EPINEPHrine (EPIPEN) 0 3 mg/0 3 mL SOAJ Inject 0 3 mL (0 3 mg total) into a muscle once for 1 dose 0 6 mL 0    levothyroxine 50 mcg tablet 1 tab mon-Friday and 2 on sat and Sunday 110 tablet 3     No current facility-administered medications for this visit        Current Outpatient Medications on File Prior to Visit Medication Sig    [DISCONTINUED] levothyroxine 50 mcg tablet 1 tab mon-Friday and 2 on sat and Sunday     No current facility-administered medications on file prior to visit  She is allergic to bee venom and penicillins       Review of Systems   Constitutional: Negative for chills, fatigue and fever  HENT: Negative for congestion, ear pain, hearing loss, nosebleeds, postnasal drip, rhinorrhea, sinus pressure, sinus pain, sneezing and sore throat  Eyes: Negative for pain, discharge, itching and visual disturbance  Respiratory: Negative for cough, chest tightness, shortness of breath and wheezing  Cardiovascular: Negative for chest pain, palpitations and leg swelling  Gastrointestinal: Negative for abdominal pain, blood in stool, constipation, diarrhea, nausea and vomiting  Genitourinary: Negative for frequency and urgency  Musculoskeletal: Negative  Skin: Negative  Neurological: Negative for dizziness, light-headedness and numbness  Objective:      /64   Pulse 74   Temp (!) 97 °F (36 1 °C)   Ht 5' 4 5" (1 638 m)   Wt 56 2 kg (123 lb 12 8 oz)   SpO2 99%   BMI 20 92 kg/m²          Physical Exam   Constitutional: She is oriented to person, place, and time  No distress  HENT:   Head: Normocephalic and atraumatic  Right Ear: Tympanic membrane, external ear and ear canal normal    Left Ear: Tympanic membrane, external ear and ear canal normal    Nose: Nose normal    Mouth/Throat: Mucous membranes are moist  No oropharyngeal exudate or posterior oropharyngeal erythema  Oropharynx is clear  Eyes: Pupils are equal, round, and reactive to light  Conjunctivae are normal    Neck: Normal range of motion  Neck supple  No muscular tenderness present  No neck rigidity  Cardiovascular: Normal rate, regular rhythm, normal heart sounds and normal pulses  No murmur heard  Pulmonary/Chest: Effort normal and breath sounds normal  No respiratory distress  She has no wheezes   She has no rales  Abdominal: Soft  Normal appearance and bowel sounds are normal  She exhibits no distension  There is no abdominal tenderness  There is no guarding  No hernia  Musculoskeletal: Normal range of motion  General: No swelling, tenderness, deformity or signs of injury  Right lower leg: No edema  Left lower leg: No edema  Lymphadenopathy:     She has no cervical adenopathy  Neurological: She is alert and oriented to person, place, and time  Gait normal    Skin: Skin is warm and dry  Lesion noted  No erythema  No pallor  Psychiatric: Her behavior is normal  Mood and affect normal    Nursing note and vitals reviewed

## 2020-08-25 LAB — TSH SERPL-ACNC: 0.8 MIU/L

## 2020-09-08 ENCOUNTER — TRANSCRIBE ORDERS (OUTPATIENT)
Dept: ADMINISTRATIVE | Facility: HOSPITAL | Age: 50
End: 2020-09-08

## 2020-09-08 DIAGNOSIS — R92.2 DENSE BREASTS: ICD-10-CM

## 2020-09-08 DIAGNOSIS — Z12.31 ENCOUNTER FOR SCREENING MAMMOGRAM FOR MALIGNANT NEOPLASM OF BREAST: Primary | ICD-10-CM

## 2020-10-07 ENCOUNTER — HOSPITAL ENCOUNTER (OUTPATIENT)
Dept: MAMMOGRAPHY | Facility: CLINIC | Age: 50
Discharge: HOME | End: 2020-10-07
Payer: COMMERCIAL

## 2020-10-07 ENCOUNTER — HOSPITAL ENCOUNTER (OUTPATIENT)
Dept: ULTRASOUND IMAGING | Facility: CLINIC | Age: 50
Discharge: HOME | End: 2020-10-07
Payer: COMMERCIAL

## 2020-10-07 DIAGNOSIS — Z12.31 ENCOUNTER FOR SCREENING MAMMOGRAM FOR MALIGNANT NEOPLASM OF BREAST: ICD-10-CM

## 2020-10-07 DIAGNOSIS — R92.2 DENSE BREASTS: ICD-10-CM

## 2020-10-07 PROCEDURE — 76377 3D RENDER W/INTRP POSTPROCES: CPT

## 2020-10-07 PROCEDURE — 76641 ULTRASOUND BREAST COMPLETE: CPT

## 2020-10-07 PROCEDURE — 77067 SCR MAMMO BI INCL CAD: CPT

## 2020-10-07 PROCEDURE — 77063 BREAST TOMOSYNTHESIS BI: CPT

## 2020-11-26 DIAGNOSIS — E03.9 HYPOTHYROIDISM, UNSPECIFIED TYPE: ICD-10-CM

## 2020-11-26 RX ORDER — LEVOTHYROXINE SODIUM 0.05 MG/1
TABLET ORAL
Qty: 110 TABLET | Refills: 3 | OUTPATIENT
Start: 2020-11-26

## 2021-08-12 ENCOUNTER — OFFICE VISIT (OUTPATIENT)
Dept: FAMILY MEDICINE CLINIC | Facility: CLINIC | Age: 51
End: 2021-08-12
Payer: COMMERCIAL

## 2021-08-12 VITALS
BODY MASS INDEX: 21.65 KG/M2 | OXYGEN SATURATION: 99 % | HEART RATE: 70 BPM | DIASTOLIC BLOOD PRESSURE: 74 MMHG | SYSTOLIC BLOOD PRESSURE: 110 MMHG | TEMPERATURE: 98.4 F | HEIGHT: 64 IN | WEIGHT: 126.8 LBS

## 2021-08-12 DIAGNOSIS — Z00.00 HEALTH MAINTENANCE EXAMINATION: Primary | ICD-10-CM

## 2021-08-12 DIAGNOSIS — Z13.220 SCREENING FOR LIPID DISORDERS: ICD-10-CM

## 2021-08-12 DIAGNOSIS — R21 RASH/SKIN ERUPTION: ICD-10-CM

## 2021-08-12 DIAGNOSIS — Z13.1 SCREENING FOR DIABETES MELLITUS: ICD-10-CM

## 2021-08-12 DIAGNOSIS — E03.9 HYPOTHYROIDISM, UNSPECIFIED TYPE: ICD-10-CM

## 2021-08-12 PROCEDURE — 3008F BODY MASS INDEX DOCD: CPT | Performed by: PHYSICIAN ASSISTANT

## 2021-08-12 PROCEDURE — 1036F TOBACCO NON-USER: CPT | Performed by: PHYSICIAN ASSISTANT

## 2021-08-12 PROCEDURE — 99396 PREV VISIT EST AGE 40-64: CPT | Performed by: PHYSICIAN ASSISTANT

## 2021-08-12 PROCEDURE — 3725F SCREEN DEPRESSION PERFORMED: CPT | Performed by: PHYSICIAN ASSISTANT

## 2021-08-12 RX ORDER — CLOTRIMAZOLE AND BETAMETHASONE DIPROPIONATE 10; .64 MG/G; MG/G
CREAM TOPICAL 2 TIMES DAILY
Qty: 30 G | Refills: 0 | Status: SHIPPED | OUTPATIENT
Start: 2021-08-12

## 2021-08-12 RX ORDER — LEVOTHYROXINE SODIUM 0.05 MG/1
TABLET ORAL
Qty: 110 TABLET | Refills: 3 | Status: SHIPPED | OUTPATIENT
Start: 2021-08-12 | End: 2022-07-18

## 2021-08-12 NOTE — PROGRESS NOTES
Assessment/Plan:       Problem List Items Addressed This Visit        Endocrine    Hypothyroidism    Relevant Medications    levothyroxine 50 mcg tablet    Other Relevant Orders    TSH, 3rd generation with Free T4 reflex      Other Visit Diagnoses     Health maintenance examination    -  Primary    Screening for lipid disorders        Relevant Orders    Lipid Panel with Direct LDL reflex    Screening for diabetes mellitus        Relevant Orders    Comprehensive metabolic panel    Rash/skin eruption        Relevant Medications    clotrimazole-betamethasone (LOTRISONE) 1-0 05 % cream        hx reviewed and updated, will trial lotrisone for rash given its occurrence in an area of moisture  Levothyroxine refilled, check TSH  Update labs and HM  Exam otherwise normal  Annual follow ups, earlier as needed  Subjective:      Patient ID: Meli Mcdermott is a 54 y o  female  Pt presents for annual health maintenance exam  She had a L meniscectomy in march, otherwise no interval health changes  Follows with ortho, still does experience L knee pain with running  She has a mammogram planned for October  Non smoker, no abuse or misuse of alcohol, no drug use  Is very physically active  No new allergies  Hx of hypothyroid s/p thyroidectomy, on levothyroxine 50mcg m-f and 100mcg Sat and Sunday  She notes a rash of her chest, present for 1 week, in the area of her upper portion of her sports bra  She shares she noticed it after running and sweating  It is red, itching, not improving  The following portions of the patient's history were reviewed and updated as appropriate:   She  has a past medical history of Arthritis and Mitral valve disorder    She   Patient Active Problem List    Diagnosis Date Noted    History of systemic reaction to bee sting 08/05/2020    History of cardiac radiofrequency ablation 08/05/2020    History of angioplasty of vein 08/05/2020    Neck discomfort 07/25/2017    Fatigue 03/11/2014  Hypothyroidism 08/29/2013    Nontoxic single thyroid nodule 06/05/2013     She  has a past surgical history that includes Thyroid lobectomy (Right); Ventricular ablation surgery; Saphenous vein graft resection; Tubal ligation (2009); and Varicose vein surgery (Right, 2009)  Her family history includes Brain cancer (age of onset: 54) in her maternal grandmother; Breast cancer (age of onset: 39) in her maternal aunt; Breast cancer (age of onset: 54) in her maternal aunt; Breast cancer (age of onset: 72) in her paternal aunt; Cancer (age of onset: 58) in her maternal grandmother; Heart disease in her father; No Known Problems in her daughter, daughter, mother, paternal grandmother, sister, and sister  She  reports that she has never smoked  She has never used smokeless tobacco  She reports that she does not drink alcohol and does not use drugs  Current Outpatient Medications   Medication Sig Dispense Refill    clotrimazole-betamethasone (LOTRISONE) 1-0 05 % cream Apply topically 2 (two) times a day 30 g 0    EPINEPHrine (EPIPEN) 0 3 mg/0 3 mL SOAJ Inject 0 3 mL (0 3 mg total) into a muscle once for 1 dose 0 6 mL 0    levothyroxine 50 mcg tablet TAKE 1 TABLET MON-FRIDAY AND 2 TABLETS ON SAT AND SUNDAY 110 tablet 3     No current facility-administered medications for this visit  Current Outpatient Medications on File Prior to Visit   Medication Sig    EPINEPHrine (EPIPEN) 0 3 mg/0 3 mL SOAJ Inject 0 3 mL (0 3 mg total) into a muscle once for 1 dose    [DISCONTINUED] levothyroxine 50 mcg tablet TAKE 1 TABLET MON-FRIDAY AND 2 TABLETS ON SAT AND SUNDAY     No current facility-administered medications on file prior to visit  She is allergic to bee venom and penicillins       Review of Systems   Constitutional: Negative for chills, fatigue and fever  HENT: Negative for congestion, ear pain, hearing loss, nosebleeds, postnasal drip, rhinorrhea, sinus pressure, sinus pain, sneezing and sore throat  Eyes: Negative for pain, discharge, itching and visual disturbance  Respiratory: Negative for cough, chest tightness, shortness of breath and wheezing  Cardiovascular: Negative for chest pain, palpitations and leg swelling  Gastrointestinal: Negative for abdominal pain, blood in stool, constipation, diarrhea, nausea and vomiting  Genitourinary: Negative for frequency and urgency  Musculoskeletal: Positive for arthralgias  Skin: Positive for rash  Neurological: Negative for dizziness, light-headedness and numbness  Psychiatric/Behavioral: Negative  Objective:      /74 (BP Location: Left arm, Patient Position: Sitting, Cuff Size: Standard)   Pulse 70   Temp 98 4 °F (36 9 °C)   Ht 5' 4" (1 626 m)   Wt 57 5 kg (126 lb 12 8 oz)   SpO2 99%   BMI 21 77 kg/m²          Physical Exam  Vitals and nursing note reviewed  Constitutional:       General: She is not in acute distress  Appearance: Normal appearance  HENT:      Head: Normocephalic and atraumatic  Nose: Nose normal    Eyes:      Pupils: Pupils are equal, round, and reactive to light  Cardiovascular:      Rate and Rhythm: Normal rate and regular rhythm  Pulses: Normal pulses  Heart sounds: Normal heart sounds  No murmur heard  Pulmonary:      Effort: Pulmonary effort is normal  No respiratory distress  Breath sounds: Normal breath sounds  No wheezing, rhonchi or rales  Abdominal:      General: Bowel sounds are normal  There is no distension  Palpations: Abdomen is soft  Tenderness: There is no abdominal tenderness  There is no guarding  Musculoskeletal:         General: Normal range of motion  Cervical back: Normal range of motion and neck supple  Right lower leg: No edema  Left lower leg: No edema  Lymphadenopathy:      Cervical: No cervical adenopathy  Skin:     General: Skin is warm and dry  Findings: Rash present            Neurological:      Mental Status: She is alert and oriented to person, place, and time     Psychiatric:         Mood and Affect: Mood and affect normal

## 2021-10-11 ENCOUNTER — APPOINTMENT (OUTPATIENT)
Dept: LAB | Facility: CLINIC | Age: 51
End: 2021-10-11
Payer: COMMERCIAL

## 2021-10-11 DIAGNOSIS — E03.9 HYPOTHYROIDISM, UNSPECIFIED TYPE: ICD-10-CM

## 2021-10-11 DIAGNOSIS — Z13.1 SCREENING FOR DIABETES MELLITUS: ICD-10-CM

## 2021-10-11 DIAGNOSIS — Z13.220 SCREENING FOR LIPID DISORDERS: ICD-10-CM

## 2021-10-11 LAB
ALBUMIN SERPL BCP-MCNC: 3.6 G/DL (ref 3.5–5)
ALP SERPL-CCNC: 58 U/L (ref 46–116)
ALT SERPL W P-5'-P-CCNC: 12 U/L (ref 12–78)
ANION GAP SERPL CALCULATED.3IONS-SCNC: 3 MMOL/L (ref 4–13)
AST SERPL W P-5'-P-CCNC: 6 U/L (ref 5–45)
BILIRUB SERPL-MCNC: 0.95 MG/DL (ref 0.2–1)
BUN SERPL-MCNC: 14 MG/DL (ref 5–25)
CALCIUM SERPL-MCNC: 9.4 MG/DL (ref 8.3–10.1)
CHLORIDE SERPL-SCNC: 110 MMOL/L (ref 100–108)
CHOLEST SERPL-MCNC: 164 MG/DL (ref 50–200)
CO2 SERPL-SCNC: 27 MMOL/L (ref 21–32)
CREAT SERPL-MCNC: 0.78 MG/DL (ref 0.6–1.3)
GFR SERPL CREATININE-BSD FRML MDRD: 90 ML/MIN/1.73SQ M
GLUCOSE P FAST SERPL-MCNC: 78 MG/DL (ref 65–99)
HDLC SERPL-MCNC: 65 MG/DL
LDLC SERPL CALC-MCNC: 84 MG/DL (ref 0–100)
POTASSIUM SERPL-SCNC: 4 MMOL/L (ref 3.5–5.3)
PROT SERPL-MCNC: 7.1 G/DL (ref 6.4–8.2)
SODIUM SERPL-SCNC: 140 MMOL/L (ref 136–145)
TRIGL SERPL-MCNC: 75 MG/DL
TSH SERPL DL<=0.05 MIU/L-ACNC: 0.45 UIU/ML (ref 0.36–3.74)

## 2021-10-11 PROCEDURE — 36415 COLL VENOUS BLD VENIPUNCTURE: CPT

## 2021-10-11 PROCEDURE — 80061 LIPID PANEL: CPT

## 2021-10-11 PROCEDURE — 80053 COMPREHEN METABOLIC PANEL: CPT

## 2021-10-11 PROCEDURE — 84443 ASSAY THYROID STIM HORMONE: CPT

## 2021-11-24 ENCOUNTER — HOSPITAL ENCOUNTER (OUTPATIENT)
Dept: ULTRASOUND IMAGING | Facility: CLINIC | Age: 51
Discharge: HOME | End: 2021-11-24
Payer: COMMERCIAL

## 2021-11-24 ENCOUNTER — HOSPITAL ENCOUNTER (OUTPATIENT)
Dept: MAMMOGRAPHY | Facility: CLINIC | Age: 51
Discharge: HOME | End: 2021-11-24
Payer: COMMERCIAL

## 2021-11-24 VITALS — HEIGHT: 64 IN | WEIGHT: 126 LBS | BODY MASS INDEX: 21.51 KG/M2

## 2021-11-24 DIAGNOSIS — Z12.31 ENCOUNTER FOR SCREENING MAMMOGRAM FOR MALIGNANT NEOPLASM OF BREAST: ICD-10-CM

## 2021-11-24 DIAGNOSIS — R92.2 DENSE BREAST: ICD-10-CM

## 2021-11-24 PROCEDURE — 77063 BREAST TOMOSYNTHESIS BI: CPT

## 2021-11-24 PROCEDURE — 77067 SCR MAMMO BI INCL CAD: CPT

## 2021-11-24 PROCEDURE — 76641 ULTRASOUND BREAST COMPLETE: CPT

## 2022-07-18 DIAGNOSIS — E03.9 HYPOTHYROIDISM, UNSPECIFIED TYPE: ICD-10-CM

## 2022-07-18 RX ORDER — LEVOTHYROXINE SODIUM 0.05 MG/1
TABLET ORAL
Qty: 110 TABLET | Refills: 3 | Status: SHIPPED | OUTPATIENT
Start: 2022-07-18

## 2022-08-16 ENCOUNTER — OFFICE VISIT (OUTPATIENT)
Dept: FAMILY MEDICINE CLINIC | Facility: CLINIC | Age: 52
End: 2022-08-16
Payer: COMMERCIAL

## 2022-08-16 VITALS
TEMPERATURE: 97.9 F | DIASTOLIC BLOOD PRESSURE: 78 MMHG | OXYGEN SATURATION: 97 % | SYSTOLIC BLOOD PRESSURE: 114 MMHG | HEIGHT: 64 IN | BODY MASS INDEX: 22.53 KG/M2 | HEART RATE: 79 BPM | WEIGHT: 132 LBS

## 2022-08-16 DIAGNOSIS — Z13.1 SCREENING FOR DIABETES MELLITUS: ICD-10-CM

## 2022-08-16 DIAGNOSIS — Z13.220 SCREENING FOR LIPID DISORDERS: ICD-10-CM

## 2022-08-16 DIAGNOSIS — Z12.11 SCREENING FOR COLON CANCER: ICD-10-CM

## 2022-08-16 DIAGNOSIS — Z00.00 HEALTH MAINTENANCE EXAMINATION: Primary | ICD-10-CM

## 2022-08-16 DIAGNOSIS — E03.9 HYPOTHYROIDISM, UNSPECIFIED TYPE: ICD-10-CM

## 2022-08-16 DIAGNOSIS — Z91.030 HISTORY OF SYSTEMIC REACTION TO BEE STING: ICD-10-CM

## 2022-08-16 PROBLEM — M54.2 NECK DISCOMFORT: Status: RESOLVED | Noted: 2017-07-25 | Resolved: 2022-08-16

## 2022-08-16 PROCEDURE — 99396 PREV VISIT EST AGE 40-64: CPT | Performed by: PHYSICIAN ASSISTANT

## 2022-08-16 PROCEDURE — 3725F SCREEN DEPRESSION PERFORMED: CPT | Performed by: PHYSICIAN ASSISTANT

## 2022-08-16 RX ORDER — IBUPROFEN 600 MG/1
600 TABLET ORAL 3 TIMES DAILY
COMMUNITY
Start: 2021-12-15 | End: 2022-12-15

## 2022-08-16 RX ORDER — EPINEPHRINE 0.3 MG/.3ML
0.3 INJECTION SUBCUTANEOUS ONCE
Qty: 0.6 ML | Refills: 0 | Status: SHIPPED | OUTPATIENT
Start: 2022-08-16 | End: 2022-08-16

## 2022-08-16 NOTE — PROGRESS NOTES
Assessment/Plan:       Problem List Items Addressed This Visit        Endocrine    Hypothyroidism    Relevant Orders    TSH, 3rd generation with Free T4 reflex       Other    History of systemic reaction to bee sting    Relevant Medications    ibuprofen (MOTRIN) 600 mg tablet    EPINEPHrine (EPIPEN) 0 3 mg/0 3 mL SOAJ      Other Visit Diagnoses     Health maintenance examination    -  Primary    Screening for colon cancer        Relevant Orders    Cologuard    Screening for lipid disorders        Relevant Orders    Lipid Panel with Direct LDL reflex    Screening for diabetes mellitus        Relevant Orders    Comprehensive metabolic panel        hx reviewed and updated, normal exam, no acute concerns  Update labs as above  Update pap with GYN  mammo in 9/2022  cologuard agreed upon  Annual follow ups, earlier prn     Subjective:      Patient ID: Sanjana Sal is a 50 y o  female  Pt presents for annual physical  No acute concerns    Hypothyroid: on levothyroxine 50mcg, due for TSH  UTD with mammography  Due for PAP and CRC screening    No interval health changes    Overall feeling well        The following portions of the patient's history were reviewed and updated as appropriate:   She  has a past medical history of Arthritis and Mitral valve disorder  She   Patient Active Problem List    Diagnosis Date Noted    History of systemic reaction to bee sting 08/05/2020    History of cardiac radiofrequency ablation 08/05/2020    History of angioplasty of vein 08/05/2020    Hypothyroidism 08/29/2013    Nontoxic single thyroid nodule 06/05/2013     She  has a past surgical history that includes Thyroid lobectomy (Right); Ventricular ablation surgery; Saphenous vein graft resection; Tubal ligation (2009); and Varicose vein surgery (Right, 2009)    Her family history includes Brain cancer (age of onset: 54) in her maternal grandmother; Breast cancer (age of onset: 39) in her maternal aunt; Breast cancer (age of onset: 54) in her maternal aunt; Breast cancer (age of onset: 72) in her paternal aunt; Cancer (age of onset: 58) in her maternal grandmother; Heart disease in her father; No Known Problems in her daughter, daughter, mother, paternal grandmother, sister, and sister  She  reports that she has never smoked  She has never used smokeless tobacco  She reports that she does not drink alcohol and does not use drugs  Current Outpatient Medications   Medication Sig Dispense Refill    clotrimazole-betamethasone (LOTRISONE) 1-0 05 % cream Apply topically 2 (two) times a day 30 g 0    EPINEPHrine (EPIPEN) 0 3 mg/0 3 mL SOAJ Inject 0 3 mL (0 3 mg total) into a muscle once for 1 dose 0 6 mL 0    ibuprofen (MOTRIN) 600 mg tablet Take 600 mg by mouth Three times a day      levothyroxine 50 mcg tablet TAKE 1 TABLET MON-FRIDAY AND 2 TABLETS ON SAT AND SUNDAY 110 tablet 3     No current facility-administered medications for this visit  Current Outpatient Medications on File Prior to Visit   Medication Sig    clotrimazole-betamethasone (LOTRISONE) 1-0 05 % cream Apply topically 2 (two) times a day    ibuprofen (MOTRIN) 600 mg tablet Take 600 mg by mouth Three times a day    levothyroxine 50 mcg tablet TAKE 1 TABLET MON-FRIDAY AND 2 TABLETS ON SAT AND SUNDAY    [DISCONTINUED] EPINEPHrine (EPIPEN) 0 3 mg/0 3 mL SOAJ Inject 0 3 mL (0 3 mg total) into a muscle once for 1 dose     No current facility-administered medications on file prior to visit  She is allergic to bee venom and penicillins       Review of Systems   Constitutional: Negative for chills, fatigue and fever  HENT: Negative for congestion, ear pain, hearing loss, nosebleeds, postnasal drip, rhinorrhea, sinus pressure, sinus pain, sneezing and sore throat  Eyes: Negative for pain, discharge, itching and visual disturbance  Respiratory: Negative for cough, chest tightness, shortness of breath and wheezing      Cardiovascular: Negative for chest pain, palpitations and leg swelling  Gastrointestinal: Negative for abdominal pain, blood in stool, constipation, diarrhea, nausea and vomiting  Genitourinary: Negative for frequency and urgency  Musculoskeletal: Negative  Skin: Negative  Neurological: Negative for dizziness, light-headedness and numbness  Psychiatric/Behavioral: Negative  Objective:      /78 (BP Location: Left arm, Patient Position: Sitting, Cuff Size: Large)   Pulse 79   Temp 97 9 °F (36 6 °C)   Ht 5' 4" (1 626 m)   Wt 59 9 kg (132 lb)   SpO2 97%   BMI 22 66 kg/m²          Physical Exam  Vitals and nursing note reviewed  Exam conducted with a chaperone present  Constitutional:       General: She is not in acute distress  Appearance: Normal appearance  HENT:      Head: Normocephalic and atraumatic  Right Ear: Tympanic membrane, ear canal and external ear normal       Left Ear: Tympanic membrane, ear canal and external ear normal       Nose: Nose normal       Mouth/Throat:      Mouth: Mucous membranes are moist       Pharynx: Oropharynx is clear  No oropharyngeal exudate or posterior oropharyngeal erythema  Eyes:      Pupils: Pupils are equal, round, and reactive to light  Cardiovascular:      Rate and Rhythm: Normal rate and regular rhythm  Heart sounds: Normal heart sounds  No murmur heard  Pulmonary:      Effort: Pulmonary effort is normal  No respiratory distress  Breath sounds: Normal breath sounds  No wheezing, rhonchi or rales  Abdominal:      General: Bowel sounds are normal  There is no distension  Palpations: Abdomen is soft  Tenderness: There is no abdominal tenderness  There is no guarding  Musculoskeletal:         General: Normal range of motion  Cervical back: Normal range of motion and neck supple  Right lower leg: No edema  Left lower leg: No edema  Skin:     General: Skin is warm and dry     Neurological:      Mental Status: She is alert and oriented to person, place, and time     Psychiatric:         Mood and Affect: Mood and affect normal

## 2023-05-17 DIAGNOSIS — E03.9 HYPOTHYROIDISM, UNSPECIFIED TYPE: ICD-10-CM

## 2023-05-17 RX ORDER — LEVOTHYROXINE SODIUM 0.05 MG/1
TABLET ORAL
Qty: 110 TABLET | Refills: 3 | Status: SHIPPED | OUTPATIENT
Start: 2023-05-17

## 2023-07-10 DIAGNOSIS — E03.9 HYPOTHYROIDISM, UNSPECIFIED TYPE: ICD-10-CM

## 2023-07-10 RX ORDER — LEVOTHYROXINE SODIUM 0.05 MG/1
TABLET ORAL
Qty: 108 TABLET | Refills: 5 | Status: SHIPPED | OUTPATIENT
Start: 2023-07-10

## 2023-09-02 DIAGNOSIS — E03.9 HYPOTHYROIDISM, UNSPECIFIED TYPE: ICD-10-CM

## 2023-09-05 RX ORDER — LEVOTHYROXINE SODIUM 0.05 MG/1
TABLET ORAL
Qty: 108 TABLET | Refills: 6 | OUTPATIENT
Start: 2023-09-05

## 2023-09-29 DIAGNOSIS — Z13.1 SCREENING FOR DIABETES MELLITUS: ICD-10-CM

## 2023-09-29 DIAGNOSIS — Z13.220 SCREENING FOR LIPID DISORDERS: ICD-10-CM

## 2023-09-29 DIAGNOSIS — E03.9 HYPOTHYROIDISM, UNSPECIFIED TYPE: Primary | ICD-10-CM

## 2023-12-07 DIAGNOSIS — E03.9 HYPOTHYROIDISM, UNSPECIFIED TYPE: ICD-10-CM

## 2023-12-07 RX ORDER — LEVOTHYROXINE SODIUM 0.05 MG/1
TABLET ORAL
Qty: 108 TABLET | Refills: 0 | Status: SHIPPED | OUTPATIENT
Start: 2023-12-07

## 2023-12-12 ENCOUNTER — APPOINTMENT (OUTPATIENT)
Dept: LAB | Facility: CLINIC | Age: 53
End: 2023-12-12
Payer: COMMERCIAL

## 2023-12-12 DIAGNOSIS — E03.9 HYPOTHYROIDISM, UNSPECIFIED TYPE: ICD-10-CM

## 2023-12-12 DIAGNOSIS — Z13.220 SCREENING FOR LIPID DISORDERS: ICD-10-CM

## 2023-12-12 DIAGNOSIS — Z13.1 SCREENING FOR DIABETES MELLITUS: ICD-10-CM

## 2023-12-12 LAB
ALBUMIN SERPL BCP-MCNC: 4.6 G/DL (ref 3.5–5)
ALP SERPL-CCNC: 45 U/L (ref 34–104)
ALT SERPL W P-5'-P-CCNC: 8 U/L (ref 7–52)
ANION GAP SERPL CALCULATED.3IONS-SCNC: 10 MMOL/L
AST SERPL W P-5'-P-CCNC: 14 U/L (ref 13–39)
BILIRUB SERPL-MCNC: 1.32 MG/DL (ref 0.2–1)
BUN SERPL-MCNC: 14 MG/DL (ref 5–25)
CALCIUM SERPL-MCNC: 9.9 MG/DL (ref 8.4–10.2)
CHLORIDE SERPL-SCNC: 105 MMOL/L (ref 96–108)
CHOLEST SERPL-MCNC: 183 MG/DL
CO2 SERPL-SCNC: 27 MMOL/L (ref 21–32)
CREAT SERPL-MCNC: 0.75 MG/DL (ref 0.6–1.3)
GFR SERPL CREATININE-BSD FRML MDRD: 92 ML/MIN/1.73SQ M
GLUCOSE P FAST SERPL-MCNC: 82 MG/DL (ref 65–99)
HDLC SERPL-MCNC: 67 MG/DL
LDLC SERPL CALC-MCNC: 103 MG/DL (ref 0–100)
POTASSIUM SERPL-SCNC: 3.8 MMOL/L (ref 3.5–5.3)
PROT SERPL-MCNC: 7.1 G/DL (ref 6.4–8.4)
SODIUM SERPL-SCNC: 142 MMOL/L (ref 135–147)
TRIGL SERPL-MCNC: 65 MG/DL
TSH SERPL DL<=0.05 MIU/L-ACNC: 0.66 UIU/ML (ref 0.45–4.5)

## 2023-12-12 PROCEDURE — 36415 COLL VENOUS BLD VENIPUNCTURE: CPT

## 2023-12-12 PROCEDURE — 80061 LIPID PANEL: CPT

## 2023-12-12 PROCEDURE — 84443 ASSAY THYROID STIM HORMONE: CPT

## 2023-12-12 PROCEDURE — 80053 COMPREHEN METABOLIC PANEL: CPT

## 2023-12-13 ENCOUNTER — OFFICE VISIT (OUTPATIENT)
Dept: FAMILY MEDICINE CLINIC | Facility: CLINIC | Age: 53
End: 2023-12-13
Payer: COMMERCIAL

## 2023-12-13 VITALS
TEMPERATURE: 96.9 F | SYSTOLIC BLOOD PRESSURE: 110 MMHG | WEIGHT: 131 LBS | DIASTOLIC BLOOD PRESSURE: 88 MMHG | HEIGHT: 64 IN | OXYGEN SATURATION: 99 % | BODY MASS INDEX: 22.36 KG/M2 | HEART RATE: 62 BPM

## 2023-12-13 DIAGNOSIS — R00.1 BRADYCARDIA: ICD-10-CM

## 2023-12-13 DIAGNOSIS — Z91.030 HISTORY OF SYSTEMIC REACTION TO BEE STING: ICD-10-CM

## 2023-12-13 DIAGNOSIS — Z98.890 HISTORY OF CARDIAC RADIOFREQUENCY ABLATION: ICD-10-CM

## 2023-12-13 DIAGNOSIS — Z00.00 HEALTH MAINTENANCE EXAMINATION: Primary | ICD-10-CM

## 2023-12-13 DIAGNOSIS — E03.9 HYPOTHYROIDISM, UNSPECIFIED TYPE: ICD-10-CM

## 2023-12-13 DIAGNOSIS — H61.21 IMPACTED CERUMEN OF RIGHT EAR: ICD-10-CM

## 2023-12-13 PROCEDURE — 69209 REMOVE IMPACTED EAR WAX UNI: CPT | Performed by: PHYSICIAN ASSISTANT

## 2023-12-13 PROCEDURE — 99396 PREV VISIT EST AGE 40-64: CPT | Performed by: PHYSICIAN ASSISTANT

## 2023-12-13 PROCEDURE — 93000 ELECTROCARDIOGRAM COMPLETE: CPT | Performed by: PHYSICIAN ASSISTANT

## 2023-12-13 RX ORDER — EPINEPHRINE 0.3 MG/.3ML
0.3 INJECTION SUBCUTANEOUS ONCE
Qty: 0.6 ML | Refills: 0 | Status: SHIPPED | OUTPATIENT
Start: 2023-12-13 | End: 2023-12-13

## 2023-12-13 NOTE — PROGRESS NOTES
Name: Laith Charles      : 1970      MRN: [id number]  Encounter Provider: Scarlett Castro PA-C  Encounter Date: 2023   Encounter department: [address] [place] [address]     1. Health maintenance examination    2. History of systemic reaction to bee sting  -     EPINEPHrine (EPIPEN) 0.3 mg/0.3 mL SOAJ; Inject 0.3 mL (0.3 mg total) into a muscle once for 1 dose    3. Hypothyroidism, unspecified type    4. History of cardiac radiofrequency ablation    5. Impacted cerumen of right ear  -     Ear cerumen removal    6. Bradycardia  -     POCT ECG    Hx reviewed and updated. Other than extrasystoles, unremarkable exam. Pt has longstanding positional lightheadedness and lightheadedness with excitation (such as yelling) which resolves quickly. Previously, pt had an ablation in  for tachycardia. No exertional chest pain, MARQUEZ/SOB, syncope. I will place a holter in the office when one is available to assess burden of extrasystoles. Her EKG shows a sinus bradycardia. TSH wnl, continue same dose  Pt has CRC, PAP scheduled. GYN provided mammography order. Cerumen resolved with irrigation  Annual follow up, earlier prn    Depression Screening and Follow-up Plan: Patient was screened for depression during today's encounter. They screened negative with a PHQ-2 score of 0. Subjective     Pt presents for annual physical.     No interval health changes  She is scheduled for PAP with GYN, CRC screening. No FH changes  Continues on levothroxine, normal TSH  Meds/allergies reviewed  Non smoker  No abuse/misuse of alcohol or illicit drugs    She notes her R ear is clogged, hx of recurrent cerumen impaction      Review of Systems   Constitutional:  Negative for chills, fatigue and fever. HENT:  Negative for congestion, ear pain, hearing loss, nosebleeds, postnasal drip, rhinorrhea, sinus pressure, sinus pain, sneezing and sore throat.     Eyes:  Negative for pain, discharge, itching and visual disturbance. Respiratory:  Negative for cough, chest tightness, shortness of breath and wheezing. Cardiovascular:  Negative for chest pain, palpitations and leg swelling. Gastrointestinal:  Negative for abdominal pain, blood in stool, constipation, diarrhea, nausea and vomiting. Genitourinary:  Negative for frequency and urgency. Musculoskeletal: Negative. Skin: Negative. Neurological:  Negative for dizziness, light-headedness and numbness. Psychiatric/Behavioral: Negative. Past Medical History:   Diagnosis Date    Arthritis     Mitral valve disorder      Past Surgical History:   Procedure Laterality Date    SAPHENOUS VEIN GRAFT RESECTION      THYROID LOBECTOMY Right     TUBAL LIGATION  2009    VARICOSE VEIN SURGERY Right 2009    VENTRICULAR ABLATION SURGERY       Family History   Problem Relation Age of Onset    No Known Problems Mother     No Known Problems Sister     No Known Problems Daughter     Brain cancer Maternal Grandmother 54    Cancer Maternal Grandmother 58        head and neck CA    No Known Problems Paternal Grandmother     Breast cancer Paternal Aunt 72    No Known Problems Sister     No Known Problems Daughter     Breast cancer Maternal Aunt 39    Breast cancer Maternal Aunt 54    Heart disease Father      Social History     Socioeconomic History    Marital status: /Civil Union     Spouse name: None    Number of children: None    Years of education: None    Highest education level: None   Occupational History    None   Tobacco Use    Smoking status: Never    Smokeless tobacco: Never    Tobacco comments:     Hx of Former smoker per [employer].     Substance and Sexual Activity    Alcohol use: No    Drug use: No    Sexual activity: None   Other Topics Concern    None   Social History Narrative    None     Social Determinants of Health     Financial Resource Strain: Unknown (6/5/2023)    Received from [facility]    Overall Financial Resource Strain (CARDIA)     Difficulty of Paying Living Expenses: Patient refused   Food Insecurity: No Food Insecurity (6/5/2023)    Received from [address]    Hunger Vital Sign     Worried About [place] in the Last Year: Never true     [address] in the Last Year: Never true   Transportation Needs: No Transportation Needs (6/5/2023)    Received from [facility] of Transportation (Medical): No     Lack of Transportation (Non-Medical): No   Physical Activity: Sufficiently Active (6/5/2023)    Received from [address]    Exercise Vital Sign     Days of Exercise per Week: 5 days     Minutes of Exercise per Session: 40 min   Stress: No Stress Concern Present (6/5/2023)    Received from [address]     Feeling of Stress :  Only a little   Social Connections: Unknown (6/5/2023)    Received from [facility] and Isolation Panel [NHANES]     Frequency of Communication with Friends and Family: More than three times a week     Frequency of Social Gatherings with Friends and Family: Once a week     Attends Mandaeism Services: Patient refused     Active Member of Clubs or Organizations: Yes     Attends Club or Organization Meetings: More than 4 times per year     Marital Status:    Intimate Partner Violence: Not At Risk (6/5/2023)    Received from [address], Rape, and Kick questionnaire     Fear of Current or Ex-Partner: No     Emotionally Abused: No     Physically Abused: No     Sexually Abused: No   Housing Stability: Unknown (6/5/2023)    Received from [facility]     Unable to Pay for Housing in the Last Year: No     Number of Places Lived in the Last Year: Not on file     Unstable Housing in the Last Year: No     Current Outpatient Medications on File Prior to Visit   Medication Sig    levothyroxine 50 mcg tablet TAKE 1 TABLET MON-FRIDAY AND 2 TABLETS ON SAT AND SUNDAY    [DISCONTINUED] clotrimazole-betamethasone (LOTRISONE) 1-0.05 % cream Apply topically 2 (two) times a day    [DISCONTINUED] EPINEPHrine (EPIPEN) 0.3 mg/0.3 mL SOAJ Inject 0.3 mL (0.3 mg total) into a muscle once for 1 dose     Allergies   Allergen Reactions    Bee Venom     Penicillins      Immunization History   Administered Date(s) Administered    COVID-19 [employer] ([address]) vaccine 0.5 mL 03/19/2021, 02/04/2022    Tdap 07/24/2016       Objective     /88 (BP Location: Left arm, Patient Position: Sitting, Cuff Size: Adult)   Pulse 62   Temp (!) 96.9 °F (36.1 °C)   Ht 5' 4" (1.626 m)   Wt 59.4 kg (131 lb)   SpO2 99%   BMI 22.49 kg/m²     Physical Exam  Vitals and nursing note reviewed. Constitutional:       General: She is not in acute distress. Appearance: Normal appearance. HENT:      Head: Normocephalic and atraumatic. Right Ear: Tympanic membrane normal. There is impacted cerumen. Left Ear: Tympanic membrane normal. There is no impacted cerumen. Nose: Nose normal.      Mouth/Throat:      Mouth: Mucous membranes are moist.      Pharynx: Oropharynx is clear. No oropharyngeal exudate or posterior oropharyngeal erythema. Eyes:      Pupils: Pupils are equal, round, and reactive to light. Cardiovascular:      Rate and Rhythm: Regular rhythm. Bradycardia present. Extrasystoles are present. Heart sounds: Normal heart sounds. Pulmonary:      Effort: Pulmonary effort is normal. No respiratory distress. Breath sounds: Normal breath sounds. No wheezing, rhonchi or rales. Abdominal:      General: Bowel sounds are normal.      Palpations: Abdomen is soft. Musculoskeletal:         General: Normal range of motion. Cervical back: Normal range of motion and neck supple.    Skin:     General: Skin is warm and dry. Neurological:      Mental Status: She is alert and oriented to person, place, and time. Psychiatric:         Mood and Affect: Mood and affect normal.       Ear cerumen removal    Date/Time: 12/13/2023 2:20 PM    Performed by: Fanny Sommers PA-C  Authorized by: Fanny Sommers PA-C  Universal Protocol:  Consent: Verbal consent obtained. Risks and benefits: risks, benefits and alternatives were discussed  Consent given by: patient  Patient understanding: patient states understanding of the procedure being performed  Patient consent: the patient's understanding of the procedure matches consent given    Patient location:  Bedside  Procedure details:     Location:  R ear    Procedure type: irrigation only      Approach:  External  Post-procedure details:     Complication:  None    Hearing quality:  Normal    Patient tolerance of procedure:   Tolerated well, no immediate complications        Scarlett Castro PA-C

## 2023-12-19 ENCOUNTER — CLINICAL SUPPORT (OUTPATIENT)
Dept: FAMILY MEDICINE CLINIC | Facility: CLINIC | Age: 53
End: 2023-12-19
Payer: COMMERCIAL

## 2023-12-19 DIAGNOSIS — R00.2 PALPITATIONS: Primary | ICD-10-CM

## 2023-12-19 PROCEDURE — 93242 EXT ECG>48HR<7D RECORDING: CPT | Performed by: PHYSICIAN ASSISTANT

## 2023-12-28 ENCOUNTER — TELEPHONE (OUTPATIENT)
Dept: FAMILY MEDICINE CLINIC | Facility: CLINIC | Age: 53
End: 2023-12-28

## 2023-12-28 DIAGNOSIS — I47.20 V-TACH (HCC): Primary | ICD-10-CM

## 2023-12-28 NOTE — TELEPHONE ENCOUNTER
Holter shows 28 episodes of ventricular tachycardia and a 5% PVC burden. VT can be a potentially dangerous rhythm. I would recommend a cardiac electrophysiology consult as soon as possible. Would also recommend completing an echo prior. Can call if there are any questions/concerns

## 2023-12-29 NOTE — TELEPHONE ENCOUNTER
Patient notified. She was provided with the number for Dr. Vasquez's office as well as central scheduling to get both the echo and electrophysiology consult scheduled.

## 2024-01-10 ENCOUNTER — OFFICE VISIT (OUTPATIENT)
Dept: PODIATRY | Facility: CLINIC | Age: 54
End: 2024-01-10
Payer: COMMERCIAL

## 2024-01-10 VITALS
SYSTOLIC BLOOD PRESSURE: 114 MMHG | DIASTOLIC BLOOD PRESSURE: 75 MMHG | BODY MASS INDEX: 22.2 KG/M2 | WEIGHT: 130 LBS | HEART RATE: 81 BPM | HEIGHT: 64 IN

## 2024-01-10 DIAGNOSIS — G57.61 MORTON'S NEUROMA OF SECOND INTERSPACE OF RIGHT FOOT: Primary | ICD-10-CM

## 2024-01-10 PROCEDURE — 99202 OFFICE O/P NEW SF 15 MIN: CPT | Performed by: PODIATRIST

## 2024-01-10 PROCEDURE — 64455 NJX AA&/STRD PLTR COM DG NRV: CPT | Performed by: PODIATRIST

## 2024-01-10 NOTE — PROGRESS NOTES
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 Pain improved almost immediately  Reduce activity  Sneakers daily, refrain from heavy heavy activity on the forefoot.  Wear well-padded sneakers.    Check progress in 4 to 6 weeks    Nerve block    Date/Time: 1/10/2024 2:00 PM    Performed by: Declan Santiago DPM  Authorized by: Declan Santiago DPM  Universal Protocol:  Consent: Verbal consent obtained.  Risks and benefits: risks, benefits and alternatives were discussed  Consent given by: patient  Time out: Immediately prior to procedure a \"time out\" was called to verify the correct patient, procedure, equipment, support staff and site/side marked as required.  Timeout called at: 1/10/2024 2:19 PM.  Patient understanding: patient states understanding of the procedure being performed  Patient identity confirmed: verbally with patient    Indications:     Indications:  Pain relief  Location:     Body area:  Lower extremity    Lower extremity nerve:  Plantar    Nerve type:  Peripheral    Laterality:  Right  Pre-procedure details:     Skin preparation:  Alcohol  Skin anesthesia (see MAR for exact dosages):     Skin anesthesia method:  Topical application  Procedure details (see MAR for exact dosages):     Block needle gauge:  25 G    Anesthetic injected:  Lidocaine 1% WITH epi    Steroid injected:  Triamcinolone    Injection procedure:  Anatomic landmarks identified and anatomic landmarks palpated  Post-procedure details:     Dressing:  Sterile dressing    Outcome:  Pain relieved    Patient tolerance of procedure:  Tolerated well, no immediate complications        Subjective:      Patient ID: Aleah Pichardo is a 53 y.o. female.    Patient has had right foot pain for almost a year. Pain at base of 2,3 toes. " "Her toes often goes numb.         The following portions of the patient's history were reviewed and updated as appropriate: allergies, current medications, past family history, past medical history, past social history, past surgical history, and problem list.    Review of Systems    Constitutional: Negative.    Respiratory: Negative for cough and shortness of breath.    Gastrointestinal: Negative for diarrhea, nausea and vomiting.   Musculoskeletal: foot pain  Skin: Negative for rash or wound.   Neurological: Negative for weakness, numbness and headaches.       Objective:      /75 (BP Location: Left arm, Patient Position: Sitting, Cuff Size: Adult)   Pulse 81   Ht 5' 4\" (1.626 m) Comment: verbal  Wt 59 kg (130 lb)   BMI 22.31 kg/m²          Physical Exam  Vitals reviewed.   Constitutional:       Appearance: She is not ill-appearing or diaphoretic.   Cardiovascular:      Rate and Rhythm: Normal rate.      Pulses: Normal pulses.   Pulmonary:      Effort: Pulmonary effort is normal. No respiratory distress.   Musculoskeletal:         General: Tenderness (Positive Vasquez's sign right second interspace.  No metatarsal phalangeal joint instability.  Negative Lachman test.) present. No deformity.   Skin:     Capillary Refill: Capillary refill takes less than 2 seconds.      Findings: No erythema or rash.   Neurological:      Mental Status: She is alert and oriented to person, place, and time.      Sensory: No sensory deficit.      Gait: Gait normal.   Psychiatric:         Mood and Affect: Mood normal.           "

## 2024-01-16 ENCOUNTER — HOSPITAL ENCOUNTER (OUTPATIENT)
Dept: NON INVASIVE DIAGNOSTICS | Facility: CLINIC | Age: 54
Discharge: HOME | End: 2024-01-16
Payer: COMMERCIAL

## 2024-01-16 VITALS
DIASTOLIC BLOOD PRESSURE: 75 MMHG | HEART RATE: 66 BPM | HEIGHT: 64 IN | WEIGHT: 130 LBS | SYSTOLIC BLOOD PRESSURE: 114 MMHG | BODY MASS INDEX: 22.2 KG/M2

## 2024-01-16 DIAGNOSIS — I47.20 V-TACH (HCC): ICD-10-CM

## 2024-01-16 LAB
AORTIC ROOT: 3 CM
ASCENDING AORTA: 2.8 CM
BSA FOR ECHO PROCEDURE: 1.63 M2
E WAVE DECELERATION TIME: 231 MS
E/A RATIO: 0.91
FRACTIONAL SHORTENING: 39 (ref 28–44)
INTERVENTRICULAR SEPTUM IN DIASTOLE (PARASTERNAL SHORT AXIS VIEW): 1 CM
INTERVENTRICULAR SEPTUM: 1 CM (ref 0.6–1.1)
IVC: 19 MM
LAAS-AP2: 6.5 CM2
LAAS-AP4: 7.6 CM2
LEFT ATRIUM SIZE: 2.4 CM
LEFT ATRIUM VOLUME (MOD BIPLANE): 12 ML
LEFT ATRIUM VOLUME INDEX (MOD BIPLANE): 7.4 ML/M2
LEFT INTERNAL DIMENSION IN SYSTOLE: 3 CM (ref 2.1–4)
LEFT VENTRICULAR INTERNAL DIMENSION IN DIASTOLE: 4.9 CM (ref 3.5–6)
LEFT VENTRICULAR POSTERIOR WALL IN END DIASTOLE: 1 CM
LEFT VENTRICULAR STROKE VOLUME: 76 ML
LVSV (TEICH): 76 ML
MV E'TISSUE VEL-SEP: 8 CM/S
MV PEAK A VEL: 0.68 M/S
MV PEAK E VEL: 62 CM/S
RIGHT ATRIUM AREA SYSTOLE A4C: 8.4 CM2
RIGHT VENTRICLE ID DIMENSION: 2.9 CM
SL CV LEFT ATRIUM LENGTH A2C: 3.2 CM
SL CV LV EF: 60
SL CV PED ECHO LEFT VENTRICLE DIASTOLIC VOLUME (MOD BIPLANE) 2D: 112 ML
SL CV PED ECHO LEFT VENTRICLE SYSTOLIC VOLUME (MOD BIPLANE) 2D: 36 ML
TRICUSPID ANNULAR PLANE SYSTOLIC EXCURSION: 2.5 CM

## 2024-01-16 PROCEDURE — 93306 TTE W/DOPPLER COMPLETE: CPT

## 2024-01-16 PROCEDURE — 93306 TTE W/DOPPLER COMPLETE: CPT | Performed by: INTERNAL MEDICINE

## 2024-02-20 ENCOUNTER — HOSPITAL ENCOUNTER (OUTPATIENT)
Dept: MAMMOGRAPHY | Facility: CLINIC | Age: 54
Discharge: HOME | End: 2024-02-20
Payer: COMMERCIAL

## 2024-02-20 ENCOUNTER — HOSPITAL ENCOUNTER (OUTPATIENT)
Dept: ULTRASOUND IMAGING | Facility: CLINIC | Age: 54
Discharge: HOME | End: 2024-02-20
Payer: COMMERCIAL

## 2024-02-20 DIAGNOSIS — Z12.39 BREAST SCREENING: ICD-10-CM

## 2024-02-20 DIAGNOSIS — Z12.31 ENCOUNTER FOR SCREENING MAMMOGRAM FOR MALIGNANT NEOPLASM OF BREAST: ICD-10-CM

## 2024-02-20 PROCEDURE — 77067 SCR MAMMO BI INCL CAD: CPT

## 2024-02-20 PROCEDURE — 77063 BREAST TOMOSYNTHESIS BI: CPT

## 2024-02-20 PROCEDURE — 76641 ULTRASOUND BREAST COMPLETE: CPT

## 2024-02-27 DIAGNOSIS — E03.9 HYPOTHYROIDISM, UNSPECIFIED TYPE: ICD-10-CM

## 2024-02-27 RX ORDER — LEVOTHYROXINE SODIUM 0.05 MG/1
TABLET ORAL
Qty: 108 TABLET | Refills: 0 | Status: SHIPPED | OUTPATIENT
Start: 2024-02-27

## 2024-03-05 ENCOUNTER — OFFICE VISIT (OUTPATIENT)
Dept: GASTROENTEROLOGY | Facility: CLINIC | Age: 54
End: 2024-03-05
Payer: COMMERCIAL

## 2024-03-05 VITALS
DIASTOLIC BLOOD PRESSURE: 70 MMHG | HEART RATE: 58 BPM | BODY MASS INDEX: 21.34 KG/M2 | HEIGHT: 64 IN | TEMPERATURE: 97.4 F | SYSTOLIC BLOOD PRESSURE: 116 MMHG | WEIGHT: 125 LBS | OXYGEN SATURATION: 99 %

## 2024-03-05 DIAGNOSIS — Z12.11 COLON CANCER SCREENING: Primary | ICD-10-CM

## 2024-03-05 PROCEDURE — 99204 OFFICE O/P NEW MOD 45 MIN: CPT | Performed by: INTERNAL MEDICINE

## 2024-03-05 NOTE — LETTER
March 7, 2024     Javid Valerio PA-C  [address]  [facility] [address]    Patient: Aleah Pichardo   YOB: 1970   Date of Visit: 3/5/2024       Dear Dr. Valerio:    Thank you for referring Aleah Pichardo to me for evaluation. Below are my notes for this consultation.    If you have questions, please do not hesitate to call me. I look forward to following your patient along with you.         Sincerely,        Thai German III, MD        CC: No Recipients    Thai German III, MD  3/5/2024 10:49 AM  Signed  [facility]ation -  [facility]  Aleah Pichardo 1970 54 y.o. female     ASSESSMENT @ PLAN:   She is a 54-year-old female that needs colon cancer screening.  She has no symptoms and no family history.  She has never had a colon cancer screening test.    1 we will do a colonoscopy to investigate.    Chief Complaint: Colon cancer screening    HPI: She is a 54-year-old female that needs colon cancer screening.  She has no fevers chills nausea vomiting diarrhea constipation melena hematochezia.  Nobody in the family had colon cancer.  Nobody in the family had colon polyps.  Her daughter was my patient in the past.    REVIEW OF SYSTEMS:     CONSTITUTIONAL: Denies any fever, chills, or rigors. Good appetite, and no recent weight loss.  HEENT: No earache or tinnitus. Denies hearing loss or visual disturbances.  CARDIOVASCULAR: No chest pain or palpitations.   RESPIRATORY: Denies any cough, hemoptysis, shortness of breath or dyspnea on exertion.  GASTROINTESTINAL: As noted in the History of Present Illness.   GENITOURINARY: No problems with urination. Denies any hematuria or dysuria.  NEUROLOGIC: No dizziness or vertigo, denies headaches.   MUSCULOSKELETAL: Denies any muscle or joint pain.   SKIN: Denies skin rashes or itching.   ENDOCRINE: Denies excessive thirst. Denies intolerance to heat or cold.  PSYCHOSOCIAL: Denies depression or anxiety. Denies any recent memory  loss.     Past Medical History:   Diagnosis Date   • Arthritis    • Mitral valve disorder       Past Surgical History:   Procedure Laterality Date   • SAPHENOUS VEIN GRAFT RESECTION     • THYROID LOBECTOMY Right    • TUBAL LIGATION  2009   • VARICOSE VEIN SURGERY Right 2009   • VENTRICULAR ABLATION SURGERY       Social History     Socioeconomic History   • Marital status: /Civil Union     Spouse name: Not on file   • Number of children: Not on file   • Years of education: Not on file   • Highest education level: Not on file   Occupational History   • Not on file   Tobacco Use   • Smoking status: Never   • Smokeless tobacco: Never   • Tobacco comments:     Hx of Former smoker per [employer].    Vaping Use   • Vaping status: Never Used   Substance and Sexual Activity   • Alcohol use: No   • Drug use: No   • Sexual activity: Not on file   Other Topics Concern   • Not on file   Social History Narrative   • Not on file     Social Determinants of Health     Financial Resource Strain: Patient Declined (6/5/2023)    Received from [facility]    Overall Financial Resource Strain (CARDIA)    • Difficulty of Paying Living Expenses: Patient declined   Food Insecurity: No Food Insecurity (6/5/2023)    Received from [facility]    Hunger Vital Sign    • Worried About Running Out of Food in the Last Year: Never true    • Ran Out of Food in the Last Year: Never true   Transportation Needs: No Transportation Needs (6/5/2023)    Received from [facility]    PRAPARE - Transportation    • Lack of Transportation (Medical): No    • Lack of Transportation (Non-Medical): No   Physical Activity: Sufficiently Active (6/5/2023)    Received from [facility]    Exercise Vital Sign    • Days of Exercise per Week: 5 days    • Minutes of Exercise per Session: 40 min   Stress: No Stress Concern Present (6/5/2023)    Received from [facility]    [place]  [place] [employer] - Occupational Stress Questionnaire    • Feeling of Stress : Only a little   Social Connections: Unknown (6/5/2023)    Received from [facility]    Social Connection and Isolation Panel [NHANES]    • Frequency of Communication with Friends and Family: More than three times a week    • Frequency of Social Gatherings with Friends and Family: Once a week    • Attends Gnosticism Services: Patient declined    • Active Member of Clubs or Organizations: Yes    • Attends Club or Organization Meetings: More than 4 times per year    • Marital Status:    Intimate Partner Violence: Not At Risk (6/5/2023)    Received from [facility]    Humiliation, Afraid, Rape, and Kick questionnaire    • Fear of Current or Ex-Partner: No    • Emotionally Abused: No    • Physically Abused: No    • Sexually Abused: No   Housing Stability: Unknown (6/5/2023)    Received from [facility]    Housing Stability Vital Sign    • Unable to Pay for Housing in the Last Year: No    • Number of Places Lived in the Last Year: Not on file    • Unstable Housing in the Last Year: No     Family History   Problem Relation Age of Onset   • No Known Problems Mother    • No Known Problems Sister    • No Known Problems Daughter    • Brain cancer Maternal Grandmother 55   • Cancer Maternal Grandmother 62        head and neck CA   • No Known Problems Paternal Grandmother    • Breast cancer Paternal Aunt 65   • No Known Problems Sister    • No Known Problems Daughter    • Breast cancer Maternal Aunt 45   • Breast cancer Maternal Aunt 55   • Heart disease Father      Bee venom and Penicillins  Current Outpatient Medications   Medication Sig Dispense Refill   • levothyroxine 50 mcg tablet TAKE 1 TABLET MON-FRIDAY AND 2 TABLETS ON SAT AND SUNDAY 108 tablet 0   • EPINEPHrine (EPIPEN) 0.3 mg/0.3 mL SOAJ Inject 0.3 mL (0.3 mg total) into a muscle once for 1 dose 0.6 mL 0     No current  "facility-administered medications for this visit.       Blood pressure 116/70, pulse 58, temperature (!) 97.4 °F (36.3 °C), height 5' 4\" (1.626 m), weight 56.7 kg (125 lb), SpO2 99%.    PHYSICAL EXAM:     General Appearance:   Alert, cooperative, no distress, appears stated age    HEENT:   Normocephalic, atraumatic, anicteric.     Neck:  Supple, symmetrical, trachea midline, no adenopathy;    thyroid: no enlargement/tenderness/nodules; no carotid  bruit or JVD    Lungs:   Clear to auscultation bilaterally; no rales, rhonchi or wheezing; respirations unlabored    Heart::   S1 and S2 normal; regular rate and rhythm; no murmur, rub, or gallop.   Abdomen:   Soft, non-tender, non-distended; normal bowel sounds; no masses, no organomegaly    Genitalia:   Deferred    Rectal:   Deferred    Extremities:  No cyanosis, clubbing or edema    Pulses:  2+ and symmetric all extremities    Skin:  Skin color, texture, turgor normal, no rashes or lesions    Lymph nodes:  No palpable cervical, axillary or inguinal lymphadenopathy        Lab Results   Component Value Date    WBC 5.50 01/16/2019    HGB 11.4 (L) 01/16/2019    HCT 35.4 01/16/2019    MCV 85 01/16/2019     01/16/2019     Lab Results   Component Value Date    CALCIUM 9.9 12/12/2023    K 3.8 12/12/2023    CO2 27 12/12/2023     12/12/2023    BUN 14 12/12/2023    CREATININE 0.75 12/12/2023     Lab Results   Component Value Date    ALT 8 12/12/2023    AST 14 12/12/2023    ALKPHOS 45 12/12/2023     No results found for: \"INR\", \"PROTIME\"        Answers submitted by the patient for this visit:  Abdominal Pain Questionnaire (Submitted on 3/3/2024)  Chief Complaint: Abdominal pain  belching: Yes  constipation: Yes  dysuria: No  fever: No  flatus: No  frequency: No  headaches: No  hematochezia: No  hematuria: No  melena: No  myalgias: No  nausea: No  weight loss: No  vomiting: No    "

## 2024-03-05 NOTE — PATIENT INSTRUCTIONS
Scheduled date of colonoscopy (as of today):3/27/24  Physician performing colonoscopy:Maxi  Location of colonoscopy:[place]  Bowel prep reviewed with patient:miralax/dulcolax  Instructions reviewed with patient by:Alexandria CLOUD  Clearances:  none

## 2024-03-05 NOTE — PROGRESS NOTES
Consultation -  [facility]  Aelah Pichardo 1970 54 y.o. female     ASSESSMENT @ PLAN:   She is a 54-year-old female that needs colon cancer screening.  She has no symptoms and no family history.  She has never had a colon cancer screening test.    1 we will do a colonoscopy to investigate.    Chief Complaint: Colon cancer screening    HPI: She is a 54-year-old female that needs colon cancer screening.  She has no fevers chills nausea vomiting diarrhea constipation melena hematochezia.  Nobody in the family had colon cancer.  Nobody in the family had colon polyps.  Her daughter was my patient in the past.    REVIEW OF SYSTEMS:     CONSTITUTIONAL: Denies any fever, chills, or rigors. Good appetite, and no recent weight loss.  HEENT: No earache or tinnitus. Denies hearing loss or visual disturbances.  CARDIOVASCULAR: No chest pain or palpitations.   RESPIRATORY: Denies any cough, hemoptysis, shortness of breath or dyspnea on exertion.  GASTROINTESTINAL: As noted in the History of Present Illness.   GENITOURINARY: No problems with urination. Denies any hematuria or dysuria.  NEUROLOGIC: No dizziness or vertigo, denies headaches.   MUSCULOSKELETAL: Denies any muscle or joint pain.   SKIN: Denies skin rashes or itching.   ENDOCRINE: Denies excessive thirst. Denies intolerance to heat or cold.  PSYCHOSOCIAL: Denies depression or anxiety. Denies any recent memory loss.     Past Medical History:   Diagnosis Date    Arthritis     Mitral valve disorder       Past Surgical History:   Procedure Laterality Date    SAPHENOUS VEIN GRAFT RESECTION      THYROID LOBECTOMY Right     TUBAL LIGATION  2009    VARICOSE VEIN SURGERY Right 2009    VENTRICULAR ABLATION SURGERY       Social History     Socioeconomic History    Marital status: /Civil Union     Spouse name: Not on file    Number of children: Not on file    Years of education: Not on file    Highest education level: Not on file   Occupational  History    Not on file   Tobacco Use    Smoking status: Never    Smokeless tobacco: Never    Tobacco comments:     Hx of Former smoker per [employer].    Vaping Use    Vaping status: Never Used   Substance and Sexual Activity    Alcohol use: No    Drug use: No    Sexual activity: Not on file   Other Topics Concern    Not on file   Social History Narrative    Not on file     Social Determinants of Health     Financial Resource Strain: Patient Declined (6/5/2023)    Received from [facility]    Overall Financial Resource Strain (CARDIA)     Difficulty of Paying Living Expenses: Patient declined   Food Insecurity: No Food Insecurity (6/5/2023)    Received from [facility]    Hunger Vital Sign     Worried About Running Out of Food in the Last Year: Never true     Ran Out of Food in the Last Year: Never true   Transportation Needs: No Transportation Needs (6/5/2023)    Received from [facility]    PRAPARE - Transportation     Lack of Transportation (Medical): No     Lack of Transportation (Non-Medical): No   Physical Activity: Sufficiently Active (6/5/2023)    Received from [facility]    Exercise Vital Sign     Days of Exercise per Week: 5 days     Minutes of Exercise per Session: 40 min   Stress: No Stress Concern Present (6/5/2023)    Received from [facility]    [place] [employer] - Occupational Stress Questionnaire     Feeling of Stress : Only a little   Social Connections: Unknown (6/5/2023)    Received from [facility]    Social Connection and Isolation Panel [NHANES]     Frequency of Communication with Friends and Family: More than three times a week     Frequency of Social Gatherings with Friends and Family: Once a week     Attends Bahai Services: Patient declined     Active Member of Clubs or Organizations: Yes     Attends Club or Organization Meetings: More than 4 times per year      "Marital Status:    Intimate Partner Violence: Not At Risk (6/5/2023)    Received from [facility]    Humiliation, Afraid, Rape, and Kick questionnaire     Fear of Current or Ex-Partner: No     Emotionally Abused: No     Physically Abused: No     Sexually Abused: No   Housing Stability: Unknown (6/5/2023)    Received from [facility]    Housing Stability Vital Sign     Unable to Pay for Housing in the Last Year: No     Number of Places Lived in the Last Year: Not on file     Unstable Housing in the Last Year: No     Family History   Problem Relation Age of Onset    No Known Problems Mother     No Known Problems Sister     No Known Problems Daughter     Brain cancer Maternal Grandmother 55    Cancer Maternal Grandmother 62        head and neck CA    No Known Problems Paternal Grandmother     Breast cancer Paternal Aunt 65    No Known Problems Sister     No Known Problems Daughter     Breast cancer Maternal Aunt 45    Breast cancer Maternal Aunt 55    Heart disease Father      Bee venom and Penicillins  Current Outpatient Medications   Medication Sig Dispense Refill    levothyroxine 50 mcg tablet TAKE 1 TABLET MON-FRIDAY AND 2 TABLETS ON SAT AND SUNDAY 108 tablet 0    EPINEPHrine (EPIPEN) 0.3 mg/0.3 mL SOAJ Inject 0.3 mL (0.3 mg total) into a muscle once for 1 dose 0.6 mL 0     No current facility-administered medications for this visit.       Blood pressure 116/70, pulse 58, temperature (!) 97.4 °F (36.3 °C), height 5' 4\" (1.626 m), weight 56.7 kg (125 lb), SpO2 99%.    PHYSICAL EXAM:     General Appearance:   Alert, cooperative, no distress, appears stated age    HEENT:   Normocephalic, atraumatic, anicteric.     Neck:  Supple, symmetrical, trachea midline, no adenopathy;    thyroid: no enlargement/tenderness/nodules; no carotid  bruit or JVD    Lungs:   Clear to auscultation bilaterally; no rales, rhonchi or wheezing; respirations unlabored    Heart::   S1 and S2 normal; " "regular rate and rhythm; no murmur, rub, or gallop.   Abdomen:   Soft, non-tender, non-distended; normal bowel sounds; no masses, no organomegaly    Genitalia:   Deferred    Rectal:   Deferred    Extremities:  No cyanosis, clubbing or edema    Pulses:  2+ and symmetric all extremities    Skin:  Skin color, texture, turgor normal, no rashes or lesions    Lymph nodes:  No palpable cervical, axillary or inguinal lymphadenopathy        Lab Results   Component Value Date    WBC 5.50 01/16/2019    HGB 11.4 (L) 01/16/2019    HCT 35.4 01/16/2019    MCV 85 01/16/2019     01/16/2019     Lab Results   Component Value Date    CALCIUM 9.9 12/12/2023    K 3.8 12/12/2023    CO2 27 12/12/2023     12/12/2023    BUN 14 12/12/2023    CREATININE 0.75 12/12/2023     Lab Results   Component Value Date    ALT 8 12/12/2023    AST 14 12/12/2023    ALKPHOS 45 12/12/2023     No results found for: \"INR\", \"PROTIME\"        Answers submitted by the patient for this visit:  Abdominal Pain Questionnaire (Submitted on 3/3/2024)  Chief Complaint: Abdominal pain  belching: Yes  constipation: Yes  dysuria: No  fever: No  flatus: No  frequency: No  headaches: No  hematochezia: No  hematuria: No  melena: No  myalgias: No  nausea: No  weight loss: No  vomiting: No    "

## 2024-03-05 NOTE — H&P (VIEW-ONLY)
Consultation -  [facility]  Aleah Pichardo 1970 54 y.o. female     ASSESSMENT @ PLAN:   She is a 54-year-old female that needs colon cancer screening.  She has no symptoms and no family history.  She has never had a colon cancer screening test.    1 we will do a colonoscopy to investigate.    Chief Complaint: Colon cancer screening    HPI: She is a 54-year-old female that needs colon cancer screening.  She has no fevers chills nausea vomiting diarrhea constipation melena hematochezia.  Nobody in the family had colon cancer.  Nobody in the family had colon polyps.  Her daughter was my patient in the past.    REVIEW OF SYSTEMS:     CONSTITUTIONAL: Denies any fever, chills, or rigors. Good appetite, and no recent weight loss.  HEENT: No earache or tinnitus. Denies hearing loss or visual disturbances.  CARDIOVASCULAR: No chest pain or palpitations.   RESPIRATORY: Denies any cough, hemoptysis, shortness of breath or dyspnea on exertion.  GASTROINTESTINAL: As noted in the History of Present Illness.   GENITOURINARY: No problems with urination. Denies any hematuria or dysuria.  NEUROLOGIC: No dizziness or vertigo, denies headaches.   MUSCULOSKELETAL: Denies any muscle or joint pain.   SKIN: Denies skin rashes or itching.   ENDOCRINE: Denies excessive thirst. Denies intolerance to heat or cold.  PSYCHOSOCIAL: Denies depression or anxiety. Denies any recent memory loss.     Past Medical History:   Diagnosis Date    Arthritis     Mitral valve disorder       Past Surgical History:   Procedure Laterality Date    SAPHENOUS VEIN GRAFT RESECTION      THYROID LOBECTOMY Right     TUBAL LIGATION  2009    VARICOSE VEIN SURGERY Right 2009    VENTRICULAR ABLATION SURGERY       Social History     Socioeconomic History    Marital status: /Civil Union     Spouse name: Not on file    Number of children: Not on file    Years of education: Not on file    Highest education level: Not on file   Occupational  History    Not on file   Tobacco Use    Smoking status: Never    Smokeless tobacco: Never    Tobacco comments:     Hx of Former smoker per [employer].    Vaping Use    Vaping status: Never Used   Substance and Sexual Activity    Alcohol use: No    Drug use: No    Sexual activity: Not on file   Other Topics Concern    Not on file   Social History Narrative    Not on file     Social Determinants of Health     Financial Resource Strain: Patient Declined (6/5/2023)    Received from [facility]    Overall Financial Resource Strain (CARDIA)     Difficulty of Paying Living Expenses: Patient declined   Food Insecurity: No Food Insecurity (6/5/2023)    Received from [facility]    Hunger Vital Sign     Worried About Running Out of Food in the Last Year: Never true     Ran Out of Food in the Last Year: Never true   Transportation Needs: No Transportation Needs (6/5/2023)    Received from [facility]    PRAPARE - Transportation     Lack of Transportation (Medical): No     Lack of Transportation (Non-Medical): No   Physical Activity: Sufficiently Active (6/5/2023)    Received from [facility]    Exercise Vital Sign     Days of Exercise per Week: 5 days     Minutes of Exercise per Session: 40 min   Stress: No Stress Concern Present (6/5/2023)    Received from [facility]    [place] [employer] [employer] - Occupational Stress Questionnaire     Feeling of Stress : Only a little   Social Connections: Unknown (6/5/2023)    Received from [facility]    Social Connection and Isolation Panel [NHANES]     Frequency of Communication with Friends and Family: More than three times a week     Frequency of Social Gatherings with Friends and Family: Once a week     Attends [employer] Services: Patient declined     Active Member of Clubs or Organizations: Yes     Attends Club or Organization Meetings: More than 4 times per year      "Marital Status:    Intimate Partner Violence: Not At Risk (6/5/2023)    Received from [facility]    Humiliation, Afraid, Rape, and Kick questionnaire     Fear of Current or Ex-Partner: No     Emotionally Abused: No     Physically Abused: No     Sexually Abused: No   Housing Stability: Unknown (6/5/2023)    Received from [facility]    Housing Stability Vital Sign     Unable to Pay for Housing in the Last Year: No     Number of Places Lived in the Last Year: Not on file     Unstable Housing in the Last Year: No     Family History   Problem Relation Age of Onset    No Known Problems Mother     No Known Problems Sister     No Known Problems Daughter     Brain cancer Maternal Grandmother 55    Cancer Maternal Grandmother 62        head and neck CA    No Known Problems Paternal Grandmother     Breast cancer Paternal Aunt 65    No Known Problems Sister     No Known Problems Daughter     Breast cancer Maternal Aunt 45    Breast cancer Maternal Aunt 55    Heart disease Father      Bee venom and Penicillins  Current Outpatient Medications   Medication Sig Dispense Refill    levothyroxine 50 mcg tablet TAKE 1 TABLET MON-FRIDAY AND 2 TABLETS ON SAT AND SUNDAY 108 tablet 0    EPINEPHrine (EPIPEN) 0.3 mg/0.3 mL SOAJ Inject 0.3 mL (0.3 mg total) into a muscle once for 1 dose 0.6 mL 0     No current facility-administered medications for this visit.       Blood pressure 116/70, pulse 58, temperature (!) 97.4 °F (36.3 °C), height 5' 4\" (1.626 m), weight 56.7 kg (125 lb), SpO2 99%.    PHYSICAL EXAM:     General Appearance:   Alert, cooperative, no distress, appears stated age    HEENT:   Normocephalic, atraumatic, anicteric.     Neck:  Supple, symmetrical, trachea midline, no adenopathy;    thyroid: no enlargement/tenderness/nodules; no carotid  bruit or JVD    Lungs:   Clear to auscultation bilaterally; no rales, rhonchi or wheezing; respirations unlabored    Heart::   S1 and S2 normal; " "regular rate and rhythm; no murmur, rub, or gallop.   Abdomen:   Soft, non-tender, non-distended; normal bowel sounds; no masses, no organomegaly    Genitalia:   Deferred    Rectal:   Deferred    Extremities:  No cyanosis, clubbing or edema    Pulses:  2+ and symmetric all extremities    Skin:  Skin color, texture, turgor normal, no rashes or lesions    Lymph nodes:  No palpable cervical, axillary or inguinal lymphadenopathy        Lab Results   Component Value Date    WBC 5.50 01/16/2019    HGB 11.4 (L) 01/16/2019    HCT 35.4 01/16/2019    MCV 85 01/16/2019     01/16/2019     Lab Results   Component Value Date    CALCIUM 9.9 12/12/2023    K 3.8 12/12/2023    CO2 27 12/12/2023     12/12/2023    BUN 14 12/12/2023    CREATININE 0.75 12/12/2023     Lab Results   Component Value Date    ALT 8 12/12/2023    AST 14 12/12/2023    ALKPHOS 45 12/12/2023     No results found for: \"INR\", \"PROTIME\"        Answers submitted by the patient for this visit:  Abdominal Pain Questionnaire (Submitted on 3/3/2024)  Chief Complaint: Abdominal pain  belching: Yes  constipation: Yes  dysuria: No  fever: No  flatus: No  frequency: No  headaches: No  hematochezia: No  hematuria: No  melena: No  myalgias: No  nausea: No  weight loss: No  vomiting: No    "

## 2024-03-13 ENCOUNTER — TELEPHONE (OUTPATIENT)
Dept: GASTROENTEROLOGY | Facility: CLINIC | Age: 54
End: 2024-03-13

## 2024-03-27 ENCOUNTER — ANESTHESIA EVENT (OUTPATIENT)
Dept: GASTROENTEROLOGY | Facility: HOSPITAL | Age: 54
End: 2024-03-27

## 2024-03-27 ENCOUNTER — ANESTHESIA (OUTPATIENT)
Dept: GASTROENTEROLOGY | Facility: HOSPITAL | Age: 54
End: 2024-03-27

## 2024-03-27 ENCOUNTER — HOSPITAL ENCOUNTER (OUTPATIENT)
Dept: GASTROENTEROLOGY | Facility: HOSPITAL | Age: 54
Setting detail: OUTPATIENT SURGERY
Discharge: HOME | End: 2024-03-27
Attending: INTERNAL MEDICINE
Payer: COMMERCIAL

## 2024-03-27 VITALS
SYSTOLIC BLOOD PRESSURE: 115 MMHG | TEMPERATURE: 97.2 F | HEART RATE: 50 BPM | OXYGEN SATURATION: 100 % | DIASTOLIC BLOOD PRESSURE: 58 MMHG | WEIGHT: 126.76 LBS | BODY MASS INDEX: 21.64 KG/M2 | RESPIRATION RATE: 18 BRPM | HEIGHT: 64 IN

## 2024-03-27 DIAGNOSIS — Z12.11 COLON CANCER SCREENING: ICD-10-CM

## 2024-03-27 PROCEDURE — G0121 COLON CA SCRN NOT HI RSK IND: HCPCS | Performed by: INTERNAL MEDICINE

## 2024-03-27 RX ORDER — SODIUM CHLORIDE, SODIUM LACTATE, POTASSIUM CHLORIDE, CALCIUM CHLORIDE 600; 310; 30; 20 MG/100ML; MG/100ML; MG/100ML; MG/100ML
INJECTION, SOLUTION INTRAVENOUS CONTINUOUS PRN
Status: DISCONTINUED | OUTPATIENT
Start: 2024-03-27 | End: 2024-03-27

## 2024-03-27 RX ORDER — LIDOCAINE HYDROCHLORIDE 10 MG/ML
INJECTION, SOLUTION EPIDURAL; INFILTRATION; INTRACAUDAL; PERINEURAL AS NEEDED
Status: DISCONTINUED | OUTPATIENT
Start: 2024-03-27 | End: 2024-03-27

## 2024-03-27 RX ORDER — PROPOFOL 10 MG/ML
INJECTION, EMULSION INTRAVENOUS AS NEEDED
Status: DISCONTINUED | OUTPATIENT
Start: 2024-03-27 | End: 2024-03-27

## 2024-03-27 RX ADMIN — PROPOFOL 50 MG: 10 INJECTION, EMULSION INTRAVENOUS at 10:44

## 2024-03-27 RX ADMIN — PROPOFOL 50 MG: 10 INJECTION, EMULSION INTRAVENOUS at 10:38

## 2024-03-27 RX ADMIN — PROPOFOL 50 MG: 10 INJECTION, EMULSION INTRAVENOUS at 10:41

## 2024-03-27 RX ADMIN — LIDOCAINE HYDROCHLORIDE 50 MG: 10 INJECTION, SOLUTION EPIDURAL; INFILTRATION; INTRACAUDAL at 10:35

## 2024-03-27 RX ADMIN — PROPOFOL 100 MG: 10 INJECTION, EMULSION INTRAVENOUS at 10:35

## 2024-03-27 RX ADMIN — SODIUM CHLORIDE, SODIUM LACTATE, POTASSIUM CHLORIDE, AND CALCIUM CHLORIDE: .6; .31; .03; .02 INJECTION, SOLUTION INTRAVENOUS at 10:24

## 2024-03-27 NOTE — INTERVAL H&P NOTE
H&P reviewed. After examining the patient I find no changes in the patients condition since the H&P had been written.    Vitals:    03/27/24 0905   BP: 126/60   Pulse: 63   Resp: 16   Temp: 97.9 °F (36.6 °C)   SpO2: 97%

## 2024-03-27 NOTE — ANESTHESIA POSTPROCEDURE EVALUATION
Post-Op Assessment Note    CV Status:  Stable  Pain Score: 0    Pain management: adequate       Mental Status:  Sleepy and arousable   Hydration Status:  Euvolemic   PONV Controlled:  Controlled   Airway Patency:  Patent     Post Op Vitals Reviewed: Yes    No anethesia notable event occurred.    Staff: SVEN               /55 (03/27/24 1051)    Temp (!) 97.2 °F (36.2 °C) (03/27/24 1051)    Pulse 76 (03/27/24 1051)   Resp 18 (03/27/24 1051)    SpO2 94 % (03/27/24 1051)

## 2024-03-27 NOTE — ANESTHESIA PREPROCEDURE EVALUATION
Procedure:  COLONOSCOPY    Relevant Problems   ENDO   (+) Hypothyroidism      Surgery/Wound/Pain   (+) History of cardiac radiofrequency ablation             Anesthesia Plan  ASA Score- 2     Anesthesia Type- IV sedation with anesthesia with ASA Monitors.         Additional Monitors:     Airway Plan:            Plan Factors-Exercise tolerance (METS): >4 METS.    Chart reviewed.   Existing labs reviewed. Patient summary reviewed.                  Induction- intravenous.    Postoperative Plan-     Informed Consent- Anesthetic plan and risks discussed with patient.  I personally reviewed this patient with the CRNA. Discussed and agreed on the Anesthesia Plan with the CRNA..

## 2024-04-30 ENCOUNTER — CONSULT (OUTPATIENT)
Dept: CARDIOLOGY CLINIC | Facility: CLINIC | Age: 54
End: 2024-04-30
Payer: COMMERCIAL

## 2024-04-30 VITALS
SYSTOLIC BLOOD PRESSURE: 112 MMHG | HEIGHT: 64 IN | HEART RATE: 82 BPM | DIASTOLIC BLOOD PRESSURE: 82 MMHG | BODY MASS INDEX: 21.73 KG/M2 | WEIGHT: 127.3 LBS

## 2024-04-30 DIAGNOSIS — R00.1 BRADYCARDIA: ICD-10-CM

## 2024-04-30 DIAGNOSIS — I47.29 NSVT (NONSUSTAINED VENTRICULAR TACHYCARDIA) (HCC): Primary | ICD-10-CM

## 2024-04-30 DIAGNOSIS — E03.9 HYPOTHYROIDISM, UNSPECIFIED TYPE: ICD-10-CM

## 2024-04-30 DIAGNOSIS — R00.2 PALPITATIONS: ICD-10-CM

## 2024-04-30 PROCEDURE — 93000 ELECTROCARDIOGRAM COMPLETE: CPT | Performed by: INTERNAL MEDICINE

## 2024-04-30 PROCEDURE — 99204 OFFICE O/P NEW MOD 45 MIN: CPT | Performed by: INTERNAL MEDICINE

## 2024-04-30 NOTE — PROGRESS NOTES
EPS Consultation/New Patient Evaluation   [facility]  [facility] [address], [place] [address]    Name: Aleah Pichardo  : 1970  MRN: [id number]      Assessment/Plan:  NSVT with palpitations  Experiences occasional palpitation w/ associated chest discomfort, SOB, and lightheadedness  Affirms 2 syncopal episodes around 1-2y ago which occurred after standing (denies racing heart rate at the time)  Hx of [place]assen Tachycardia s/p radiofrequency ablation in   Has been experiencing palpitations since she was a child  Develop sustained tachyarrhythmia in  which required outpatient EP follow-up in [place]  Since her ablation in  she denies sustained tachyarrhythmia  She continues with on/off palpitations as above  Holter 2023:  28 episodes NSVT (longest 6 beats w/ fastest 222bpm)  5% PVC burden  Avg HR 74bpm  Min HR 34bpm in AM  ECHO 2024:  EF 60%  No regional wall motion abnormality  BL atrial size WNL  No significant valvular disease  Not currently on outpatient beta-blocker  No major electrolyte deficiencies on 2023 CMP  Bradycardia  Noted prior documentation of such   HR 82 in office today  Holter 2023:  Avg HR 74bpm  Min HR 34bpm in AM  Hypothyroidism  2024 TSH WNL  On levothyroxine      Discussion/Plan:    Patient's history was discussed in detail.  Of particular concern, she does affirm 2 syncopal episodes around 2 years ago.  Her described history and reported baseline low BP sound suspicious for vasovagal/orthostatic symptomatology. However given her past medical history, and runs of VT + bradycardia noted on Holter monitor would like to rule out cardiogenic source.    Plan to obtain cardiac MRI (rule out infiltrate, lesion, MAD), stress ECHO, and genetic testing for further evaluation.     In regards to medical therapy for her PVCs and short runs of NSVT; given her bradycardia and borderline BP, medication management may prove  difficult.  Additionally the patient states that she would like to avoid medications if at all possible.  Should aggressive medical therapy be desired in the future, she may require PPM given her bradycardia, however her borderline blood pressures pose an additional difficulty factor.  For now we will hold on ordering additional medication while awaiting workup as above.    Discussed possibility of loop implantation in the future for closer monitoring after workup as above is completed.    Patient has been instructed to follow up in our EP office in 3 months or as needed. She will call our office with any questions or concerns in the meantime.    Rhythm History:   Atrial fibrillation:      Atrial flutter:      SVT:      VT/VF/PVC:     Device history:   Pacemaker:     Defibrillator:     BIV PPM:     BIV ICD:     ILR:    HPI:   Aleah Pichardo is a 54 y.o. female with a PMH of Hypothyroidism and bradycardia.     Patient affirms longstanding history of palpitations and rapid heart rates.  She reports noting occasional palpitation even while as a child.  In 2000 she notes developing a rapid heart rate after bending over which did not self alexandra, prompting ED visit.  Upon arrival to the emergency department at that time she was noted to be in a rapid heart rate requiring adenosine.  She followed with outpatient EP around that time who had difficulty with ablation, she reports they were unable to identify rapid heart rate in the upper chambers.  Eventually upon repeat EP study they were able to find abnormal electrical activity within her ventricles, which was ablated, and she was diagnosed with Belhassen tachycardia.  Since then she reports continued palpitations, with associated shortness of breath, chest discomfort, and lightheadedness.      She was seen at her outpatient PCP office 12/2023 where she had complaints of feeling extra heartbeats.  Her PCP ordered a Holter monitor for further evaluation, which showed 28  "runs of NSVT and a 5% PVC burden.  After these findings she was recommended to obtain echocardiogram, and follow-up with the outpatient EP office.    She presents today for further discussion regarding her runs of NSVT.  She reports ongoing palpitations since she was a little girl.  In 2000 she developed a sustained tachyarrhythmia which required ED presentation, and multiple doses of adenosine.  She reports following with outpatient EP in [place] following this event, where she was subsequently diagnosed with Belhassen VT and underwent ablation for such in 2000.  Since then she reports continued, chronic, unchanging palpitations with associated chest discomfort, SOB, and lightheadedness.  She does affirm that around 1 to 2 years ago she had 2 episodes of syncope, which at that time she felt a \"slow\" and \"heavy\" heart rate, and developed syncope after she stood suddenly.  Otherwise she denies current chest pain, SOB, dizzy/lightheadedness, syncope/presyncope, palpitation, and leg swelling, with the remainder of the ROS negative as below.  `  EKG: Sinus rhythm with incomplete BBB and PVCs, ventricular rate 82 bpm      Review of Systems   Constitutional:  Negative for appetite change, chills, fatigue, fever and unexpected weight change.   Respiratory:  Positive for shortness of breath (Associated with palpitations). Negative for chest tightness.    Cardiovascular:  Positive for chest pain (Associated with palpitations) and palpitations. Negative for leg swelling.   Neurological:  Positive for syncope (2 episodes around 1-2 years ago) and light-headedness (Associated with palpitations). Negative for dizziness and weakness.       Objective:     Vitals: Blood pressure 112/82, pulse 82, height 5' 4\" (1.626 m), weight 57.7 kg (127 lb 4.8 oz), last menstrual period 06/03/2019., Body mass index is 21.85 kg/m².,        Physical Exam:   Physical Exam  Constitutional:       General: She is not in acute distress.     " Appearance: Normal appearance. She is not ill-appearing.   HENT:      Head: Normocephalic and atraumatic.      Nose: Nose normal.   Eyes:      General:         Right eye: No discharge.         Left eye: No discharge.   Neck:      Vascular: No carotid bruit.   Cardiovascular:      Rate and Rhythm: Normal rate and regular rhythm.      Pulses: Normal pulses.      Heart sounds: Normal heart sounds.   Pulmonary:      Effort: Pulmonary effort is normal.      Breath sounds: Normal breath sounds.   Musculoskeletal:      Right lower leg: No edema.      Left lower leg: No edema.   Skin:     General: Skin is warm and dry.      Capillary Refill: Capillary refill takes less than 2 seconds.   Neurological:      Mental Status: She is alert.            Medications:      Current Outpatient Medications:     EPINEPHrine (EPIPEN) 0.3 mg/0.3 mL SOAJ, Inject 0.3 mL (0.3 mg total) into a muscle once for 1 dose, Disp: 0.6 mL, Rfl: 0    levothyroxine 50 mcg tablet, TAKE 1 TABLET MON-FRIDAY AND 2 TABLETS ON SAT AND SUNDAY, Disp: 108 tablet, Rfl: 0       Historical Information   Past Medical History:   Diagnosis Date    Arthritis     Disease of thyroid gland     Mitral valve disorder        Past Surgical History:   Procedure Laterality Date    SAPHENOUS VEIN GRAFT RESECTION      THYROID LOBECTOMY Right     TUBAL LIGATION  2009    VARICOSE VEIN SURGERY Right 2009    VENTRICULAR ABLATION SURGERY         Social History     Substance and Sexual Activity   Alcohol Use No     Social History     Substance and Sexual Activity   Drug Use No     Social History     Tobacco Use   Smoking Status Never   Smokeless Tobacco Never   Tobacco Comments    Hx of Former smoker per [employer].        Family History   Problem Relation Age of Onset    No Known Problems Mother     No Known Problems Sister     No Known Problems Daughter     Brain cancer Maternal Grandmother 55    Cancer Maternal Grandmother 62        head and neck CA    No Known Problems Paternal  Grandmother     Breast cancer Paternal Aunt 65    No Known Problems Sister     No Known Problems Daughter     Breast cancer Maternal Aunt 45    Breast cancer Maternal Aunt 55    Heart disease Father          Labs & Results:  Below is the patient's most recent value for Albumin, ALT, AST, BUN, Calcium, Chloride, Cholesterol, CO2, Creatinine, GFR, Glucose, HDL, Hematocrit, Hemoglobin, Hemoglobin A1C, LDL, Magnesium, Phosphorus, Platelets, Potassium, PSA, Sodium, Triglycerides, and WBC.   Lab Results   Component Value Date    ALT 8 12/12/2023    AST 14 12/12/2023    BUN 14 12/12/2023    CALCIUM 9.9 12/12/2023     12/12/2023    CO2 27 12/12/2023    CREATININE 0.75 12/12/2023    HDL 67 12/12/2023    HCT 35.4 01/16/2019    HGB 11.4 (L) 01/16/2019    MG 2.0 01/16/2019     01/16/2019    K 3.8 12/12/2023    TRIG 65 12/12/2023    WBC 5.50 01/16/2019     Note: for a comprehensive list of the patient's lab results, access the Results Review activity.

## 2024-05-01 PROBLEM — Z12.11 COLON CANCER SCREENING: Status: RESOLVED | Noted: 2024-03-05 | Resolved: 2024-05-01

## 2024-05-07 ENCOUNTER — TELEPHONE (OUTPATIENT)
Dept: CARDIOLOGY CLINIC | Facility: CLINIC | Age: 54
End: 2024-05-07

## 2024-05-15 ENCOUNTER — HOSPITAL ENCOUNTER (OUTPATIENT)
Dept: NON INVASIVE DIAGNOSTICS | Facility: HOSPITAL | Age: 54
Discharge: HOME | End: 2024-05-15
Payer: COMMERCIAL

## 2024-05-15 VITALS — WEIGHT: 127 LBS | BODY MASS INDEX: 21.68 KG/M2 | HEIGHT: 64 IN

## 2024-05-15 DIAGNOSIS — R00.2 PALPITATIONS: ICD-10-CM

## 2024-05-15 DIAGNOSIS — I47.29 NSVT (NONSUSTAINED VENTRICULAR TACHYCARDIA) (HCC): ICD-10-CM

## 2024-05-15 LAB
ARRHY DURING EX: NORMAL
CHEST PAIN STATEMENT: NORMAL
MAX DIASTOLIC BP: 50 MMHG
MAX HR PERCENT: 104 %
MAX HR: 176 BPM
MAX PREDICTED HEART RATE: 168 BPM
PROTOCOL NAME: NORMAL
RATE PRESSURE PRODUCT: NORMAL
REASON FOR TERMINATION: NORMAL
SL CV LV EF: 55
SL CV STRESS RECOVERY BP: NORMAL MMHG
SL CV STRESS RECOVERY HR: 90 BPM
SL CV STRESS STAGE REACHED: 4
STRESS BASELINE BP: NORMAL MMHG
STRESS BASELINE HR: 70 BPM
STRESS O2 SAT REST: 98 %
STRESS PEAK HR: 176 BPM
STRESS POST ESTIMATED WORKLOAD: 11.7 METS
STRESS POST EXERCISE DUR MIN: 9 MIN
STRESS POST EXERCISE DUR MIN: 9 MIN
STRESS POST EXERCISE DUR SEC: 30 SEC
STRESS POST EXERCISE DUR SEC: 31 SEC
STRESS POST O2 SAT PEAK: 98 %
STRESS POST PEAK BP: 160 MMHG
STRESS POST PEAK HR: 176 BPM
STRESS POST PEAK SYSTOLIC BP: 160 MMHG
TARGET HR FORMULA: NORMAL
TEST INDICATION: NORMAL

## 2024-05-15 PROCEDURE — 93350 STRESS TTE ONLY: CPT | Performed by: INTERNAL MEDICINE

## 2024-05-15 PROCEDURE — 93350 STRESS TTE ONLY: CPT

## 2024-05-15 RX ADMIN — PERFLUTREN 0.8 ML/MIN: 6.52 INJECTION, SUSPENSION INTRAVENOUS at 09:00

## 2024-05-19 DIAGNOSIS — E03.9 HYPOTHYROIDISM, UNSPECIFIED TYPE: ICD-10-CM

## 2024-05-19 RX ORDER — LEVOTHYROXINE SODIUM 0.05 MG/1
TABLET ORAL
Qty: 108 TABLET | Refills: 1 | Status: SHIPPED | OUTPATIENT
Start: 2024-05-19

## 2024-05-24 DIAGNOSIS — E03.9 HYPOTHYROIDISM, UNSPECIFIED TYPE: ICD-10-CM

## 2024-05-24 RX ORDER — LEVOTHYROXINE SODIUM 0.05 MG/1
TABLET ORAL
Qty: 108 TABLET | Refills: 1 | OUTPATIENT
Start: 2024-05-24

## 2024-10-23 ENCOUNTER — OFFICE VISIT (OUTPATIENT)
Dept: FAMILY MEDICINE CLINIC | Facility: CLINIC | Age: 54
End: 2024-10-23
Payer: COMMERCIAL

## 2024-10-23 ENCOUNTER — TELEPHONE (OUTPATIENT)
Age: 54
End: 2024-10-23

## 2024-10-23 VITALS
WEIGHT: 131 LBS | BODY MASS INDEX: 22.36 KG/M2 | OXYGEN SATURATION: 99 % | HEART RATE: 101 BPM | HEIGHT: 64 IN | DIASTOLIC BLOOD PRESSURE: 80 MMHG | TEMPERATURE: 96.7 F | SYSTOLIC BLOOD PRESSURE: 128 MMHG

## 2024-10-23 DIAGNOSIS — M25.561 CHRONIC PAIN OF RIGHT KNEE: Primary | ICD-10-CM

## 2024-10-23 DIAGNOSIS — E03.9 HYPOTHYROIDISM, UNSPECIFIED TYPE: ICD-10-CM

## 2024-10-23 DIAGNOSIS — G89.29 CHRONIC PAIN OF RIGHT KNEE: Primary | ICD-10-CM

## 2024-10-23 PROCEDURE — 99214 OFFICE O/P EST MOD 30 MIN: CPT | Performed by: PHYSICIAN ASSISTANT

## 2024-10-23 NOTE — PROGRESS NOTES
Ambulatory Visit  Name: Aleah Pichardo      : 1970      MRN: [id number]  Encounter Provider: Javid Valerio PA-C  Encounter Date: 10/23/2024   Encounter department: [facility]    Assessment & Plan  Chronic pain of right knee  Suspect degenerative joint disease with concern for meniscal derangement. Seek MRI. Return to ortho, referral placed. Continue prn nsaids/tylenol. Reviewed previous knee XRs  Orders:    MRI knee right  wo contrast; Future    Ambulatory Referral to Orthopedic Surgery; Future    Hypothyroidism, unspecified type  Due for TSH  Orders:    TSH, 3rd generation with Free T4 reflex; Future         History of Present Illness     Pt presents with worsening chronic R knee pain. She is a previous avid runner with bilat knee pain and hx of procedures on both knees most recently arthroscopic meniscal surgery 3/2021 at . She has daily lifestyle limiting R knee pain. Most recent XRs with mod arthritic change. No new injuries. She takes otc tylneol and ibuprofen prn.       Review of Systems   Constitutional: Negative.    Musculoskeletal:  Positive for arthralgias and gait problem. Negative for joint swelling.   Skin: Negative.    Neurological:  Negative for weakness and numbness.     Past Medical History:   Diagnosis Date    Arthritis     Disease of thyroid gland     Mitral valve disorder      Past Surgical History:   Procedure Laterality Date    SAPHENOUS VEIN GRAFT RESECTION      THYROID LOBECTOMY Right     TUBAL LIGATION  2009    VARICOSE VEIN SURGERY Right 2009    VENTRICULAR ABLATION SURGERY       Family History   Problem Relation Age of Onset    No Known Problems Mother     No Known Problems Sister     No Known Problems Daughter     Brain cancer Maternal Grandmother 55    Cancer Maternal Grandmother 62        head and neck CA    No Known Problems Paternal Grandmother     Breast cancer Paternal Aunt 65    No Known Problems Sister     No Known Problems Daughter     Breast  "cancer Maternal Aunt 45    Breast cancer Maternal Aunt 55    Heart disease Father      Social History     Tobacco Use    Smoking status: Never    Smokeless tobacco: Never    Tobacco comments:     Hx of Former smoker per [employer].    Vaping Use    Vaping status: Never Used   Substance and Sexual Activity    Alcohol use: No    Drug use: No    Sexual activity: Not on file     Current Outpatient Medications on File Prior to Visit   Medication Sig    EPINEPHrine (EPIPEN) 0.3 mg/0.3 mL SOAJ Inject 0.3 mL (0.3 mg total) into a muscle once for 1 dose    levothyroxine 50 mcg tablet TAKE 1 TABLET MON-FRIDAY AND 2 TABLETS ON SAT AND SUNDAY     Allergies   Allergen Reactions    Bee Venom     Penicillins      Immunization History   Administered Date(s) Administered    COVID-19 J&J (PV Nano Cell) vaccine 0.5 mL 03/19/2021, 02/04/2022    Tdap 07/24/2016     Objective     /80 (BP Location: Left arm, Patient Position: Sitting, Cuff Size: Adult)   Pulse 101   Temp (!) 96.7 °F (35.9 °C)   Ht 5' 4\" (1.626 m)   Wt 59.4 kg (131 lb)   LMP 06/03/2019   SpO2 99%   BMI 22.49 kg/m²     Physical Exam  Vitals and nursing note reviewed.   Constitutional:       Appearance: Normal appearance.   Pulmonary:      Effort: Pulmonary effort is normal. No respiratory distress.   Musculoskeletal:      Right knee: Bony tenderness present. No swelling. Decreased range of motion. Tenderness present over the medial joint line. No LCL laxity, MCL laxity, ACL laxity or PCL laxity.      Instability Tests: Medial Carmina test positive.   Skin:     General: Skin is warm and dry.   Neurological:      Mental Status: She is alert.         "

## 2024-10-29 ENCOUNTER — HOSPITAL ENCOUNTER (OUTPATIENT)
Dept: MRI IMAGING | Facility: HOSPITAL | Age: 54
Discharge: HOME | End: 2024-10-29
Payer: COMMERCIAL

## 2024-10-29 DIAGNOSIS — M25.561 CHRONIC PAIN OF RIGHT KNEE: ICD-10-CM

## 2024-10-29 DIAGNOSIS — G89.29 CHRONIC PAIN OF RIGHT KNEE: ICD-10-CM

## 2024-10-29 PROCEDURE — 73721 MRI JNT OF LWR EXTRE W/O DYE: CPT

## 2024-11-01 DIAGNOSIS — E03.9 HYPOTHYROIDISM, UNSPECIFIED TYPE: ICD-10-CM

## 2024-11-01 RX ORDER — LEVOTHYROXINE SODIUM 50 UG/1
TABLET ORAL
Qty: 108 TABLET | Refills: 1 | Status: SHIPPED | OUTPATIENT
Start: 2024-11-01

## 2024-11-06 ENCOUNTER — APPOINTMENT (OUTPATIENT)
Dept: RADIOLOGY | Facility: CLINIC | Age: 54
End: 2024-11-06
Payer: COMMERCIAL

## 2024-11-06 ENCOUNTER — OFFICE VISIT (OUTPATIENT)
Dept: OBGYN CLINIC | Facility: CLINIC | Age: 54
End: 2024-11-06
Payer: COMMERCIAL

## 2024-11-06 VITALS
DIASTOLIC BLOOD PRESSURE: 75 MMHG | HEIGHT: 64 IN | WEIGHT: 131 LBS | HEART RATE: 67 BPM | SYSTOLIC BLOOD PRESSURE: 109 MMHG | BODY MASS INDEX: 22.36 KG/M2

## 2024-11-06 DIAGNOSIS — G89.29 CHRONIC PAIN OF RIGHT KNEE: ICD-10-CM

## 2024-11-06 DIAGNOSIS — M25.561 RIGHT KNEE PAIN, UNSPECIFIED CHRONICITY: ICD-10-CM

## 2024-11-06 DIAGNOSIS — M25.561 CHRONIC PAIN OF RIGHT KNEE: ICD-10-CM

## 2024-11-06 DIAGNOSIS — M17.11 PRIMARY OSTEOARTHRITIS OF RIGHT KNEE: Primary | ICD-10-CM

## 2024-11-06 PROCEDURE — 20610 DRAIN/INJ JOINT/BURSA W/O US: CPT | Performed by: ORTHOPAEDIC SURGERY

## 2024-11-06 PROCEDURE — 73564 X-RAY EXAM KNEE 4 OR MORE: CPT

## 2024-11-06 PROCEDURE — 99204 OFFICE O/P NEW MOD 45 MIN: CPT | Performed by: ORTHOPAEDIC SURGERY

## 2024-11-06 RX ORDER — METHYLPREDNISOLONE ACETATE 40 MG/ML
2 INJECTION, SUSPENSION INTRA-ARTICULAR; INTRALESIONAL; INTRAMUSCULAR; SOFT TISSUE
Status: COMPLETED | OUTPATIENT
Start: 2024-11-06 | End: 2024-11-06

## 2024-11-06 RX ORDER — LIDOCAINE HYDROCHLORIDE 10 MG/ML
2 INJECTION, SOLUTION INFILTRATION; PERINEURAL
Status: COMPLETED | OUTPATIENT
Start: 2024-11-06 | End: 2024-11-06

## 2024-11-06 RX ORDER — BUPIVACAINE HYDROCHLORIDE 2.5 MG/ML
2 INJECTION, SOLUTION INFILTRATION; PERINEURAL
Status: COMPLETED | OUTPATIENT
Start: 2024-11-06 | End: 2024-11-06

## 2024-11-06 RX ADMIN — METHYLPREDNISOLONE ACETATE 2 ML: 40 INJECTION, SUSPENSION INTRA-ARTICULAR; INTRALESIONAL; INTRAMUSCULAR; SOFT TISSUE at 09:00

## 2024-11-06 RX ADMIN — LIDOCAINE HYDROCHLORIDE 2 ML: 10 INJECTION, SOLUTION INFILTRATION; PERINEURAL at 09:00

## 2024-11-06 RX ADMIN — BUPIVACAINE HYDROCHLORIDE 2 ML: 2.5 INJECTION, SOLUTION INFILTRATION; PERINEURAL at 09:00

## 2024-11-06 NOTE — PROGRESS NOTES
Patient Name:  Aleah Pichardo  MRN:  [id number]    Assessment & Plan     1. Primary osteoarthritis of right knee  -     Durable Medical Equipment  -     Large joint arthrocentesis: R knee  2. Chronic pain of right knee  -     XR knee 4+ vw right injury; Future; Expected date: 11/06/2024  -     Ambulatory Referral to Orthopedic Surgery  -     Large joint arthrocentesis: R knee      Right knee osteoarthritis  X-rays and previous MRI reviewed in office today with patient  In depth conversation had with patient regarding nonoperative and surgical management  Continued nonoperative treatment by means of OTC topical and oral analgesics, activity modification, outpatient physical therapy, injection therapy including CSI, visco or PRP, and bracing.   Surgical management by means of partial vs total knee arthroplasty. Discussed differences between both procedures.   Risks of surgery, including but not limited to, infection, iatrogenic fracture, periprosthetic fracture, aseptic loosening, persistent pain, wound healing complications, gait dysfunction, nerve injury, blood vessel injury, DVT/PE, loss of life or limb, postoperative stiffness, need for subsequent surgery including revision surgery, numbness/tingling in lower extremity and/or over surgical incision, bleeding complications requiring blood transfusion, and anesthesia complications   Discussed patient's age and likely need for revision in the future.  Discussed procedure, anesthesia, overnight hospital stay and working with PT/OT while inpatient, pain management, outpatient PT, return to unrestricted activity  At this time, patient wishes to move forward with CSI. Risks discussed. Tolerated well. Can repeat in 3 months as needed.  Follow up as needed      Chief Complaint     Right knee pain    History of the Present Illness     Aleah Pichardo is a 54 y.o. female with Right knee pain ongoing for months without known injury. She thinks her knees are painful from  "her running history. Patient admits to previous CSI and visco injections with minimal pain relief. She has not been running or long distance ambulation because of severe pain. She had Left knee arthroscopic partial menisectomy with Dr Bernardo in 2021. Patient also admits to some thigh pain at night, but locates most of her knee pain to the medial aspect and distally into the lower extremity.     Review of Systems     Review of Systems   Constitutional:  Negative for chills and fever.   HENT:  Negative for congestion.    Respiratory:  Negative for cough, chest tightness and shortness of breath.    Cardiovascular:  Negative for chest pain and palpitations.   Gastrointestinal:  Negative for abdominal pain.   Endocrine: Negative for cold intolerance and heat intolerance.   Neurological:  Negative for syncope.   Psychiatric/Behavioral:  Negative for confusion.        Physical Exam     /75   Pulse 67   Ht 5' 4\" (1.626 m)   Wt 59.4 kg (131 lb)   LMP 06/03/2019   BMI 22.49 kg/m²     Right Knee  Range of motion from 0 to 130 degrees without pain.    There is no crepitus with range of motion.   There is no effusion.    There is tenderness over the medial joint line.    There is 5/5 quadriceps strength and preserved tone.    The patient is able to perform a straight leg raise.    negative patellar grind test.  Anterior drawer tests is negative  negative Lachman Test.   Posterior drawer test is   negative   Varus stress testing reveals no pain or instability at 0 and 30 degrees   Valgus stress testing reveals no pain or instability at 0 and 30 degrees  The patient is neurovascular intact distally.      Eyes:  Anicteric sclerae.  Neck:  Supple.  Lungs:  Normal respiratory effort.  Cardiovascular:  Capillary refill is less than 2 seconds.  Skin:  Intact without erythema.  Neurologic:  Sensation grossly intact to light touch.  Psychiatric:  Mood and affect are appropriate.    Data Review     I have personally reviewed " pertinent films in PACS, and my interpretation follows:    X-rays taken 11/06/2024 of Left knee independently reviewed and demonstrate moderate medial compartment joint space narrowing with medial osteophytes. No obvious fracture or dislocation.    MRI performed 10/29/2024 of Left knee demonstrates moderate cartilage loss at medial compartment with associated horizontal medial mensicus tear. No fracture or dislocation.     Past Medical History:   Diagnosis Date    Arthritis     Disease of thyroid gland     Mitral valve disorder        Past Surgical History:   Procedure Laterality Date    KNEE SURGERY Left 03/05/2021    SAPHENOUS VEIN GRAFT RESECTION      THYROID LOBECTOMY Right     TUBAL LIGATION  2009    VARICOSE VEIN SURGERY Right 2009    VENTRICULAR ABLATION SURGERY         Allergies   Allergen Reactions    Bee Venom     Penicillins        Current Outpatient Medications on File Prior to Visit   Medication Sig Dispense Refill    levothyroxine 50 mcg tablet TAKE 1 TABLET MON-FRIDAY AND 2 TABLETS ON SAT AND SUNDAY 108 tablet 1    EPINEPHrine (EPIPEN) 0.3 mg/0.3 mL SOAJ Inject 0.3 mL (0.3 mg total) into a muscle once for 1 dose 0.6 mL 0     No current facility-administered medications on file prior to visit.       Social History     Tobacco Use    Smoking status: Never    Smokeless tobacco: Never    Tobacco comments:     Hx of Former smoker per [employer].    Vaping Use    Vaping status: Never Used   Substance Use Topics    Alcohol use: No    Drug use: No       Family History   Problem Relation Age of Onset    No Known Problems Mother     No Known Problems Sister     No Known Problems Daughter     Brain cancer Maternal Grandmother 55    Cancer Maternal Grandmother 62        head and neck CA    No Known Problems Paternal Grandmother     Breast cancer Paternal Aunt 65    No Known Problems Sister     No Known Problems Daughter     Breast cancer Maternal Aunt 45    Breast cancer Maternal Aunt 55    Heart disease Father               Procedures Performed     Large joint arthrocentesis: R knee  Universal Protocol:  Risks and benefits: risks, benefits and alternatives were discussed  Consent given by: patient  Patient identity confirmed: verbally with patient  Procedure Details  Location: knee - R knee  Needle size: 22 G  Approach: lateral  Medications administered: 2 mL bupivacaine 0.25 %; 2 mL lidocaine 1 %; 2 mL methylPREDNISolone acetate 40 mg/mL    Patient tolerance: patient tolerated the procedure well with no immediate complications  Dressing:  Sterile dressing applied            Emery Soto DO

## 2024-11-26 DIAGNOSIS — M25.561 CHRONIC PAIN OF RIGHT KNEE: ICD-10-CM

## 2024-11-26 DIAGNOSIS — M23.203 OTHER OLD TEAR OF MEDIAL MENISCUS OF RIGHT KNEE: Primary | ICD-10-CM

## 2024-11-26 DIAGNOSIS — G89.29 CHRONIC PAIN OF RIGHT KNEE: ICD-10-CM

## 2024-12-03 ENCOUNTER — HOSPITAL ENCOUNTER (OUTPATIENT)
Dept: MRI IMAGING | Facility: HOSPITAL | Age: 54
Discharge: HOME | End: 2024-12-03
Payer: COMMERCIAL

## 2024-12-03 DIAGNOSIS — R00.2 PALPITATIONS: ICD-10-CM

## 2024-12-03 DIAGNOSIS — I47.29 NSVT (NONSUSTAINED VENTRICULAR TACHYCARDIA) (HCC): ICD-10-CM

## 2024-12-03 PROCEDURE — 75561 CARDIAC MRI FOR MORPH W/DYE: CPT

## 2024-12-03 PROCEDURE — A9585 GADOBUTROL INJECTION: HCPCS | Performed by: PHYSICIAN ASSISTANT

## 2024-12-03 RX ORDER — GADOBUTROL 604.72 MG/ML
12 INJECTION INTRAVENOUS
Status: COMPLETED | OUTPATIENT
Start: 2024-12-03 | End: 2024-12-03

## 2024-12-03 RX ADMIN — GADOBUTROL 12 ML: 604.72 INJECTION INTRAVENOUS at 22:32

## 2024-12-05 ENCOUNTER — RESULTS FOLLOW-UP (OUTPATIENT)
Dept: CARDIOLOGY CLINIC | Facility: CLINIC | Age: 54
End: 2024-12-05

## 2024-12-06 ENCOUNTER — TELEPHONE (OUTPATIENT)
Age: 54
End: 2024-12-06

## 2024-12-06 DIAGNOSIS — I47.29 NSVT (NONSUSTAINED VENTRICULAR TACHYCARDIA) (HCC): Primary | ICD-10-CM

## 2024-12-06 NOTE — TELEPHONE ENCOUNTER
Noted, thanks for follow up. I am glad she saw cardiology and did the cardiac MRI. I will continue to follow her case

## 2024-12-06 NOTE — TELEPHONE ENCOUNTER
Patient called to advise Javid they she  received call from Cardiologist, he states they found something on her Cardiac MRI. Cardiology advised patient to see a cardiologist at [facility]. Awaiting office to call and schedule.

## 2024-12-30 ENCOUNTER — CLINICAL SUPPORT (OUTPATIENT)
Dept: CARDIOLOGY CLINIC | Facility: CLINIC | Age: 54
End: 2024-12-30
Payer: COMMERCIAL

## 2024-12-30 DIAGNOSIS — I47.29 NSVT (NONSUSTAINED VENTRICULAR TACHYCARDIA) (HCC): ICD-10-CM

## 2024-12-30 PROCEDURE — 93248 EXT ECG>7D<15D REV&INTERPJ: CPT | Performed by: INTERNAL MEDICINE

## 2025-01-02 ENCOUNTER — RESULTS FOLLOW-UP (OUTPATIENT)
Dept: CARDIOLOGY CLINIC | Facility: CLINIC | Age: 55
End: 2025-01-02

## 2025-01-02 NOTE — TELEPHONE ENCOUNTER
----- Message from Dickson Gonzales PA-C sent at 1/2/2025  8:13 AM EST -----  Hello,    Can someone please let this patient know that her latest zio monitor did not show any ventricular tachycardia.    Overall this monitor looked much better than her prior one.    To continue f/u w/ Dr. Vasquez's as scheduled.    Thanks!  ----- Message -----  From: Pedro Mcginnis MD  Sent: 1/2/2025   8:11 AM EST  To: Dickson Gonzales PA-C; Cardiology Ep Clincal    Patient had a min HR of 41 bpm, max HR of 159 bpm, and avg HR of 70 bpm. Predominant underlying rhythm was Sinus Rhythm. Isolated SVEs were rare (<1.0%), SVE Couplets were rare (<1.0%), and no SVE Triplets were present. Isolated VEs were occasional (2.8%, 43638), VE Couplets were rare (<1.0%, 610), and no VE Triplets were present. Ventricular Bigeminy and Trigeminy were present.     Agree with findings. Relatively normal Zio. HR avg 70bpm, sinus sapphire present but not signficant. No VT. Modest PVC burden at 2.8%.     Let patient know this looks very good much better than her prior monitor.

## 2025-01-02 NOTE — TELEPHONE ENCOUNTER
Spoke with pt informed about recent Zio monitor results and for her to keep F/u with Dr Mcginnis, pt understood.

## 2025-01-22 ENCOUNTER — OFFICE VISIT (OUTPATIENT)
Dept: CARDIOLOGY CLINIC | Facility: CLINIC | Age: 55
End: 2025-01-22
Payer: COMMERCIAL

## 2025-01-22 VITALS
WEIGHT: 134.1 LBS | HEIGHT: 64 IN | BODY MASS INDEX: 22.9 KG/M2 | SYSTOLIC BLOOD PRESSURE: 110 MMHG | HEART RATE: 68 BPM | DIASTOLIC BLOOD PRESSURE: 78 MMHG

## 2025-01-22 DIAGNOSIS — I47.29 NSVT (NONSUSTAINED VENTRICULAR TACHYCARDIA) (HCC): Primary | ICD-10-CM

## 2025-01-22 PROCEDURE — 93000 ELECTROCARDIOGRAM COMPLETE: CPT | Performed by: INTERNAL MEDICINE

## 2025-01-22 PROCEDURE — 99214 OFFICE O/P EST MOD 30 MIN: CPT | Performed by: INTERNAL MEDICINE

## 2025-01-22 NOTE — PROGRESS NOTES
"HEART AND VASCULAR  CARDIAC ELECTROPHYSIOLOGY   HEART RHYTHM CENTER  [facility]    Outpatient Follow-up  Today's Date: 01/22/25        Patient name: Aleah Pichardo  YOB: 1970  Sex: female         Chief Complaint: f/u PVC      ASSESSMENT:  Problem List Items Addressed This Visit          Cardiovascular and Mediastinum    NSVT (nonsustained ventricular tachycardia) (HCC) - Primary    Relevant Orders    POCT ECG     1) H/o \"[place]\" Fascicular VT ablation  2) Frequent PVC, NSVT on monitor, one was 7% burden  runs 2-4 beats some >200bpm.  Multiple morpholgoy PVC, predominantly Pappillary muscle (both AL/PM patterns on EKG  We repeated zio and was <1% PVC's and no VT.   3) SYncope x2 with prodrome when getting up last was a year ago  4)  +[facility] cardiomypathy brothers, father.    5) MRI done recently said \"  1. Top normal left ventricular size with normal systolic function.  2. Normal right ventricular size and systolic function.  3. Normal bilateral atria. Questionable minimal mitral annular disjunction on 2 chamber long axis cines, noting off-axis view. Otherwise, no convincing evidence of mitral valve prolapse or mitral annular disjunction.  4. No evidence of myocardial inflammation, infiltrative disease or scarring.         PLAN:  1) Refer to Erasmo for second opinion given \"questionable Mitral annular Dysjunction\" since she has had syncope h/o \"[place]\" Vt ablation, and has papillary morphology PVC's.   I did recommend a loop.  I am contemplating if should get dual chamber ICD especially since sinus sapphire so not able to tolerate betablockers atrial lead would be helpful.            Orders Placed This Encounter   Procedures    POCT ECG     There are no discontinued medications.      .............................................................................................    HPI/Subjective:   Ms Pichardo here for f/u after MRI and monitor. Feeling fine lately mild palpitations " and lightheadness, last syncope was last summer with some prodrome. NO injury.      Please note HPI is listed by problem with with update following it, it is copied again in the assessment above and reflects medical decision making as well.       Complete 12 point ROS reviewed and otherwise non pertinent or negative except as per HPI pertinent positives in Cardiovascular and Respiratory emphasized. Please see paper chart for outpatient clinic patients where the patient completed the 12 point ROS survey.           Past Medical History:   Diagnosis Date    Arthritis     Disease of thyroid gland     Mitral valve disorder        Allergies   Allergen Reactions    Bee Venom     Penicillins      I reviewed the Home Medication list and Allergies in the chart.   Scheduled Meds:  Current Outpatient Medications   Medication Sig Dispense Refill    EPINEPHrine (EPIPEN) 0.3 mg/0.3 mL SOAJ Inject 0.3 mL (0.3 mg total) into a muscle once for 1 dose 0.6 mL 0    levothyroxine 50 mcg tablet TAKE 1 TABLET MON-FRIDAY AND 2 TABLETS ON SAT AND SUNDAY 108 tablet 1     No current facility-administered medications for this visit.     PRN Meds:.        Family History   Problem Relation Age of Onset    No Known Problems Mother     No Known Problems Sister     No Known Problems Daughter     Brain cancer Maternal Grandmother 55    Cancer Maternal Grandmother 62        head and neck CA    No Known Problems Paternal Grandmother     Breast cancer Paternal Aunt 65    No Known Problems Sister     No Known Problems Daughter     Breast cancer Maternal Aunt 45    Breast cancer Maternal Aunt 55    Heart disease Father        Social History     Socioeconomic History    Marital status: /Civil Union     Spouse name: Not on file    Number of children: Not on file    Years of education: Not on file    Highest education level: Not on file   Occupational History    Not on file   Tobacco Use    Smoking status: Never    Smokeless tobacco: Never    Tobacco  comments:     Hx of Former smoker per [employer].    Vaping Use    Vaping status: Never Used   Substance and Sexual Activity    Alcohol use: No    Drug use: No    Sexual activity: Not on file   Other Topics Concern    Not on file   Social History Narrative    Not on file     Social Drivers of Health     Financial Resource Strain: Patient Declined (6/5/2023)    [facility]    Overall Financial Resource Strain (CARDIA)     Difficulty of Paying Living Expenses: Patient declined   Food Insecurity: No Food Insecurity (6/5/2023)    [facility]    Hunger Vital Sign     Worried About Running Out of Food in the Last Year: Never true     Ran Out of Food in the Last Year: Never true   Transportation Needs: No Transportation Needs (6/5/2023)    [facility]    PRAPARE - Transportation     Lack of Transportation (Medical): No     Lack of Transportation (Non-Medical): No   Physical Activity: Sufficiently Active (6/5/2023)    Received from [facility]    Exercise Vital Sign     Days of Exercise per Week: 5 days     Minutes of Exercise per Session: 40 min   Stress: No Stress Concern Present (6/5/2023)    [facility]    [place] [employer] - Occupational Stress Questionnaire     Feeling of Stress : Only a little   Social Connections: Unknown (6/5/2023)    [facility]    Social Connection and Isolation Panel [NHANES]     Frequency of Communication with Friends and Family: More than three times a week     Frequency of Social Gatherings with Friends and Family: Once a week     Attends Mu-ism Services: Patient declined     Active Member of Clubs or Organizations: Yes     Attends Club or  "Organization Meetings: More than 4 times per year     Marital Status:    Intimate Partner Violence: Not At Risk (6/5/2023)    [facility]    Humiliation, Afraid, Rape, and Kick questionnaire     Fear of Current or Ex-Partner: No     Emotionally Abused: No     Physically Abused: No     Sexually Abused: No   Housing Stability: Unknown (6/5/2023)    [facility]    Housing Stability Vital Sign     Unable to Pay for Housing in the Last Year: No     Number of Places Lived in the Last Year: Not on file     Unstable Housing in the Last Year: No         OBJECTIVE:    /78   Pulse 68   Ht 5' 4\" (1.626 m)   Wt 60.8 kg (134 lb 1.6 oz)   LMP 06/03/2019   BMI 23.02 kg/m²   Vitals:    01/22/25 1626   Weight: 60.8 kg (134 lb 1.6 oz)     GEN: No acute distress, Alert and oriented, well appearing  HEENT:External ears normal, oral pharynx clear, mucous membranes moist  EYES: Pupils equal, sclera anicteric, midline, normal conjuctiva  NECK: No JVD, supple, no obvious masses or thryomegaly or goiter  CARDIOVASCULAR:  RRR, No murmur, rub, gallops S1,S2  LUNGS: Clear To auscultation bilaterally, normal effort, no rales, rhonchi, crackles   ABDOMEN:  nondistended,  without obvious organomegaly or ascites  EXTREMITIES/VASCULAR:  No edema. warm an well perfused.  PSYCH: Normal Affect,  linear speech pattern without evidence of psychosis.   NEURO: Grossly intact, moving all extremiteis equal, face symmetric, alert and responsive, no obvious focal defecits   GAIT:  Ambulates normally without difficulty  HEME: No bleeding, bruising, petechia, purpura   SKIN: No significant rashes on visibile skin, warm, no diaphoresis or pallor.     Lab Results:       LABS:      Chemistry        Component Value Date/Time    K 3.8 12/12/2023 0906     12/12/2023 0906    CO2 27 12/12/2023 0906    BUN 14 12/12/2023 0906    " "CREATININE 0.75 12/12/2023 0906        Component Value Date/Time    CALCIUM 9.9 12/12/2023 0906    ALKPHOS 45 12/12/2023 0906    AST 14 12/12/2023 0906    ALT 8 12/12/2023 0906            No results found for: \"CHOL\"  Lab Results   Component Value Date    HDL 67 12/12/2023    HDL 65 10/11/2021     Lab Results   Component Value Date    LDLCALC 103 (H) 12/12/2023    LDLCALC 84 10/11/2021     Lab Results   Component Value Date    TRIG 65 12/12/2023    TRIG 75 10/11/2021     No results found for: \"CHOLHDL\"    IMAGING: No results found.     Cardiac testing:   No results found for this or any previous visit.    No results found for this or any previous visit.    No results found for this or any previous visit.    No results found for this or any previous visit.          I reviewed and interpreted the following LABS/EKG/TELE/IMAGING and below is summary of my interpretation (if data available):    LABS:    Current EKG and Rhythm Strip: NSR, she had PVDC's right bundle morphology AVL neg andbe from anterio lateral papillary muscle  "

## 2025-01-24 ENCOUNTER — TELEPHONE (OUTPATIENT)
Dept: CARDIOLOGY CLINIC | Facility: CLINIC | Age: 55
End: 2025-01-24

## 2025-01-24 NOTE — TELEPHONE ENCOUNTER
----- Message from Pedro Mcginnis MD sent at 1/22/2025  5:29 PM EST -----  Can we schedule loop? [employer]

## 2025-01-24 NOTE — TELEPHONE ENCOUNTER
"Called patient to schedule LOOP Recorder Implant and patient will call back next week when has her calendar.     Thanks,  Laurie \"Miladis\" Dwight     "

## 2025-03-19 ENCOUNTER — APPOINTMENT (OUTPATIENT)
Dept: LAB | Facility: CLINIC | Age: 55
End: 2025-03-19
Payer: COMMERCIAL

## 2025-03-19 DIAGNOSIS — E03.9 HYPOTHYROIDISM, UNSPECIFIED TYPE: ICD-10-CM

## 2025-03-19 LAB — TSH SERPL DL<=0.05 MIU/L-ACNC: 2.01 UIU/ML (ref 0.45–4.5)

## 2025-03-19 PROCEDURE — 36415 COLL VENOUS BLD VENIPUNCTURE: CPT

## 2025-03-19 PROCEDURE — 84443 ASSAY THYROID STIM HORMONE: CPT

## 2025-03-20 ENCOUNTER — RESULTS FOLLOW-UP (OUTPATIENT)
Dept: FAMILY MEDICINE CLINIC | Facility: CLINIC | Age: 55
End: 2025-03-20

## 2025-04-08 ENCOUNTER — HOSPITAL ENCOUNTER (OUTPATIENT)
Dept: MAMMOGRAPHY | Facility: CLINIC | Age: 55
Discharge: HOME | End: 2025-04-08
Payer: COMMERCIAL

## 2025-04-08 ENCOUNTER — HOSPITAL ENCOUNTER (OUTPATIENT)
Dept: ULTRASOUND IMAGING | Facility: CLINIC | Age: 55
Discharge: HOME | End: 2025-04-08
Payer: COMMERCIAL

## 2025-04-08 VITALS — HEIGHT: 64 IN | BODY MASS INDEX: 22.88 KG/M2 | WEIGHT: 134 LBS

## 2025-04-08 DIAGNOSIS — Z12.39 ENCOUNTER FOR OTHER SCREENING FOR MALIGNANT NEOPLASM OF BREAST: ICD-10-CM

## 2025-04-08 DIAGNOSIS — Z12.31 ENCOUNTER FOR SCREENING MAMMOGRAM FOR MALIGNANT NEOPLASM OF BREAST: ICD-10-CM

## 2025-04-08 PROCEDURE — 77067 SCR MAMMO BI INCL CAD: CPT

## 2025-04-08 PROCEDURE — 77063 BREAST TOMOSYNTHESIS BI: CPT

## 2025-04-08 PROCEDURE — 76641 ULTRASOUND BREAST COMPLETE: CPT

## 2025-04-19 DIAGNOSIS — E03.9 HYPOTHYROIDISM, UNSPECIFIED TYPE: ICD-10-CM

## 2025-04-20 RX ORDER — LEVOTHYROXINE SODIUM 50 UG/1
TABLET ORAL
Qty: 108 TABLET | Refills: 1 | Status: SHIPPED | OUTPATIENT
Start: 2025-04-20

## 2025-04-30 ENCOUNTER — OFFICE VISIT (OUTPATIENT)
Dept: FAMILY MEDICINE CLINIC | Facility: CLINIC | Age: 55
End: 2025-04-30
Payer: COMMERCIAL

## 2025-04-30 VITALS
DIASTOLIC BLOOD PRESSURE: 80 MMHG | HEART RATE: 78 BPM | OXYGEN SATURATION: 98 % | HEIGHT: 64 IN | BODY MASS INDEX: 23.05 KG/M2 | WEIGHT: 135 LBS | TEMPERATURE: 97.9 F | SYSTOLIC BLOOD PRESSURE: 108 MMHG

## 2025-04-30 DIAGNOSIS — Z91.030 HISTORY OF SYSTEMIC REACTION TO BEE STING: ICD-10-CM

## 2025-04-30 DIAGNOSIS — Z98.890 HISTORY OF CARDIAC RADIOFREQUENCY ABLATION: ICD-10-CM

## 2025-04-30 DIAGNOSIS — R22.1 LOCALIZED SWELLING, MASS AND LUMP, NECK: Primary | ICD-10-CM

## 2025-04-30 DIAGNOSIS — Z00.00 HEALTH MAINTENANCE EXAMINATION: ICD-10-CM

## 2025-04-30 DIAGNOSIS — Z13.220 SCREENING FOR LIPID DISORDERS: ICD-10-CM

## 2025-04-30 DIAGNOSIS — Z13.1 SCREENING FOR DIABETES MELLITUS: ICD-10-CM

## 2025-04-30 DIAGNOSIS — I47.29 NSVT (NONSUSTAINED VENTRICULAR TACHYCARDIA) (HCC): ICD-10-CM

## 2025-04-30 PROCEDURE — 99396 PREV VISIT EST AGE 40-64: CPT | Performed by: PHYSICIAN ASSISTANT

## 2025-04-30 PROCEDURE — 99214 OFFICE O/P EST MOD 30 MIN: CPT | Performed by: PHYSICIAN ASSISTANT

## 2025-04-30 RX ORDER — EPINEPHRINE 0.3 MG/.3ML
0.3 INJECTION SUBCUTANEOUS ONCE
Qty: 0.6 ML | Refills: 0 | Status: SHIPPED | OUTPATIENT
Start: 2025-04-30 | End: 2025-04-30

## 2025-04-30 NOTE — PROGRESS NOTES
Adult Annual Physical  Name: Aleah Pichardo      : 1970      MRN: [id number]  Encounter Provider: Javid Valerio PA-C  Encounter Date: 2025   Encounter department: [facility]    :  Assessment & Plan  History of systemic reaction to bee sting    Orders:  •  EPINEPHrine (EPIPEN) 0.3 mg/0.3 mL SOAJ; Inject 0.3 mL (0.3 mg total) into a muscle once for 1 dose    Localized swelling, mass and lump, neck  Seek US of the region  Orders:  •  US head neck soft tissue; Future    Screening for diabetes mellitus    Orders:  •  Comprehensive metabolic panel; Future    Screening for lipid disorders    Orders:  •  Lipid Panel with Direct LDL reflex; Future    NSVT (nonsustained ventricular tachycardia) (HCC)  Encouraged LOOP  EP follow up  Recommend [facility] follow up, contact provided        History of cardiac radiofrequency ablation  As above        Health maintenance examination  Hx reviewed and updated, screenings/guidance as below,  UTD           Preventive Screenings:  - Diabetes Screening: screening up-to-date  - Cholesterol Screening: screening up-to-date   - Hepatitis C screening: screening not indicated   - HIV screening: screening not indicated   - Cervical cancer screening: screening not indicated   - Breast cancer screening: screening up-to-date   - Colon cancer screening: screening up-to-date   - Lung cancer screening: screening not indicated     Counseling/Anticipatory Guidance:  - Alcohol: discussed moderation in alcohol intake and recommendations for healthy alcohol use.   - Drug use: discussed harms of illicit drug use and how it can negatively impact mental/physical health.   - Tobacco use: discussed harms of tobacco use and management options for quitting.   - Dental health: discussed importance of regular tooth brushing, flossing, and dental visits.   - Diet: discussed recommendations for a healthy/well-balanced diet.   - Exercise: the importance of regular  exercise/physical activity was discussed. Recommend exercise 3-5 times per week for at least 30 minutes.       Depression Screening and Follow-up Plan: Patient was screened for depression during today's encounter. They screened negative with a PHQ-2 score of 0.        History of Present Illness     Adult Annual Physical:  Patient presents for annual physical. Pt presents for concerns of a lump, R side of neck near collar bone. Has been present chronically but now bigger and cuases some nekc disocmfort    Additionally, she has a hx of VT ablation, multiple PVCs, ?mitral annular dysjunction, syncope hx, +FH CM. She follows with EP, due to place loop and also was recommended to see [facility] cardiology    Utd with mammography, PAP,CRC screening  No changes in FH  Non smoker  No abuse/misuse of alcohol or illicit drugs   Meds reviewed  Allergies reviewed .     Diet and Physical Activity:  - Diet/Nutrition: no special diet.  - Exercise: no formal exercise, 5-7 times a week on average and less than 30 minutes on average.    Depression Screening:  - PHQ-2 Score: 0    General Health:  - Sleep: sleeps well and 7-8 hours of sleep on average.  - Hearing: normal hearing bilateral ears.  - Vision: wears glasses and vision problems.  - Dental: regular dental visits.    Review of Systems   Constitutional:  Negative for chills, fatigue and fever.   HENT:  Negative for congestion, ear pain, hearing loss, nosebleeds, postnasal drip, rhinorrhea, sinus pressure, sinus pain, sneezing and sore throat.    Eyes:  Negative for pain, discharge, itching and visual disturbance.   Respiratory:  Negative for cough, chest tightness, shortness of breath and wheezing.    Cardiovascular:  Negative for chest pain, palpitations and leg swelling.   Gastrointestinal:  Negative for abdominal pain, blood in stool, constipation, diarrhea, nausea and vomiting.   Genitourinary:  Negative for frequency and urgency.   Neurological:  Positive for light-headedness.  "Negative for dizziness and numbness.         Objective   /80 (BP Location: Left arm, Patient Position: Sitting, Cuff Size: Adult)   Pulse 78   Temp 97.9 °F (36.6 °C)   Ht 5' 4\" (1.626 m)   Wt 61.2 kg (135 lb)   LMP 06/03/2019   SpO2 98%   BMI 23.17 kg/m²     Physical Exam  Vitals and nursing note reviewed.   Constitutional:       General: She is not in acute distress.     Appearance: She is well-developed.   HENT:      Head: Normocephalic and atraumatic.   Eyes:      Conjunctiva/sclera: Conjunctivae normal.   Neck:     Cardiovascular:      Rate and Rhythm: Normal rate and regular rhythm. Extrasystoles are present.     Heart sounds: No murmur heard.  Pulmonary:      Effort: Pulmonary effort is normal. No respiratory distress.      Breath sounds: Normal breath sounds.   Abdominal:      Palpations: Abdomen is soft.      Tenderness: There is no abdominal tenderness.   Musculoskeletal:         General: No swelling.      Cervical back: Neck supple.      Right lower leg: No edema.      Left lower leg: No edema.   Lymphadenopathy:      Cervical: No cervical adenopathy.   Skin:     General: Skin is warm and dry.      Capillary Refill: Capillary refill takes less than 2 seconds.   Neurological:      Mental Status: She is alert.   Psychiatric:         Mood and Affect: Mood normal.       "

## 2025-05-07 ENCOUNTER — HOSPITAL ENCOUNTER (OUTPATIENT)
Dept: ULTRASOUND IMAGING | Facility: CLINIC | Age: 55
Discharge: HOME | End: 2025-05-07
Attending: PHYSICIAN ASSISTANT
Payer: COMMERCIAL

## 2025-05-07 DIAGNOSIS — R22.1 LOCALIZED SWELLING, MASS AND LUMP, NECK: ICD-10-CM

## 2025-05-07 PROCEDURE — 76536 US EXAM OF HEAD AND NECK: CPT

## 2025-05-13 ENCOUNTER — RESULTS FOLLOW-UP (OUTPATIENT)
Dept: FAMILY MEDICINE CLINIC | Facility: CLINIC | Age: 55
End: 2025-05-13